# Patient Record
Sex: FEMALE | Race: WHITE | NOT HISPANIC OR LATINO | URBAN - METROPOLITAN AREA
[De-identification: names, ages, dates, MRNs, and addresses within clinical notes are randomized per-mention and may not be internally consistent; named-entity substitution may affect disease eponyms.]

---

## 2017-01-03 ENCOUNTER — TELEPHONE (OUTPATIENT)
Dept: MEDICAL GROUP | Facility: MEDICAL CENTER | Age: 77
End: 2017-01-03

## 2017-01-03 ENCOUNTER — HOSPITAL ENCOUNTER (OUTPATIENT)
Dept: LAB | Facility: MEDICAL CENTER | Age: 77
End: 2017-01-03
Attending: NURSE PRACTITIONER
Payer: MEDICARE

## 2017-01-03 DIAGNOSIS — I10 ESSENTIAL HYPERTENSION: ICD-10-CM

## 2017-01-03 DIAGNOSIS — E55.9 VITAMIN D DEFICIENCY: ICD-10-CM

## 2017-01-03 DIAGNOSIS — E78.5 HYPERLIPIDEMIA WITH TARGET LDL LESS THAN 130: ICD-10-CM

## 2017-01-03 DIAGNOSIS — M85.80 OSTEOPENIA: ICD-10-CM

## 2017-01-03 LAB
25(OH)D3 SERPL-MCNC: 72 NG/ML (ref 30–100)
ALBUMIN SERPL BCP-MCNC: 4.6 G/DL (ref 3.2–4.9)
ALBUMIN/GLOB SERPL: 1.5 G/DL
ALP SERPL-CCNC: 36 U/L (ref 30–99)
ALT SERPL-CCNC: 20 U/L (ref 2–50)
ANION GAP SERPL CALC-SCNC: 8 MMOL/L (ref 0–11.9)
AST SERPL-CCNC: 21 U/L (ref 12–45)
BILIRUB SERPL-MCNC: 0.5 MG/DL (ref 0.1–1.5)
BUN SERPL-MCNC: 35 MG/DL (ref 8–22)
CALCIUM SERPL-MCNC: 10.4 MG/DL (ref 8.5–10.5)
CHLORIDE SERPL-SCNC: 104 MMOL/L (ref 96–112)
CHOLEST SERPL-MCNC: 236 MG/DL (ref 100–199)
CO2 SERPL-SCNC: 26 MMOL/L (ref 20–33)
CREAT SERPL-MCNC: 0.87 MG/DL (ref 0.5–1.4)
CREAT UR-MCNC: 165.7 MG/DL
GLOBULIN SER CALC-MCNC: 3 G/DL (ref 1.9–3.5)
GLUCOSE SERPL-MCNC: 98 MG/DL (ref 65–99)
HDLC SERPL-MCNC: 64 MG/DL
LDLC SERPL CALC-MCNC: 152 MG/DL
MICROALBUMIN UR-MCNC: 0.9 MG/DL
MICROALBUMIN/CREAT UR: 5 MG/G (ref 0–30)
POTASSIUM SERPL-SCNC: 4.5 MMOL/L (ref 3.6–5.5)
PROT SERPL-MCNC: 7.6 G/DL (ref 6–8.2)
SODIUM SERPL-SCNC: 138 MMOL/L (ref 135–145)
TRIGL SERPL-MCNC: 100 MG/DL (ref 0–149)

## 2017-01-03 PROCEDURE — 80061 LIPID PANEL: CPT

## 2017-01-03 PROCEDURE — 82570 ASSAY OF URINE CREATININE: CPT

## 2017-01-03 PROCEDURE — 82043 UR ALBUMIN QUANTITATIVE: CPT

## 2017-01-03 PROCEDURE — 36415 COLL VENOUS BLD VENIPUNCTURE: CPT

## 2017-01-03 PROCEDURE — 82306 VITAMIN D 25 HYDROXY: CPT

## 2017-01-03 PROCEDURE — 80053 COMPREHEN METABOLIC PANEL: CPT

## 2017-01-09 ENCOUNTER — OFFICE VISIT (OUTPATIENT)
Dept: MEDICAL GROUP | Facility: MEDICAL CENTER | Age: 77
End: 2017-01-09
Payer: MEDICARE

## 2017-01-09 VITALS
DIASTOLIC BLOOD PRESSURE: 60 MMHG | BODY MASS INDEX: 21.38 KG/M2 | HEIGHT: 66 IN | HEART RATE: 72 BPM | SYSTOLIC BLOOD PRESSURE: 102 MMHG | OXYGEN SATURATION: 97 % | WEIGHT: 133 LBS | TEMPERATURE: 97 F

## 2017-01-09 DIAGNOSIS — N95.9 POST MENOPAUSAL PROBLEMS: ICD-10-CM

## 2017-01-09 DIAGNOSIS — Z12.31 ENCOUNTER FOR SCREENING MAMMOGRAM FOR MALIGNANT NEOPLASM OF BREAST: ICD-10-CM

## 2017-01-09 DIAGNOSIS — M85.80 OSTEOPENIA: ICD-10-CM

## 2017-01-09 DIAGNOSIS — I10 ESSENTIAL HYPERTENSION: ICD-10-CM

## 2017-01-09 DIAGNOSIS — H91.91 DECREASED HEARING OF RIGHT EAR: ICD-10-CM

## 2017-01-09 DIAGNOSIS — M48.56XS COMPRESSION FRACTURE OF LUMBAR SPINE, NON-TRAUMATIC, SEQUELA: ICD-10-CM

## 2017-01-09 DIAGNOSIS — E78.5 HYPERLIPIDEMIA WITH TARGET LDL LESS THAN 130: ICD-10-CM

## 2017-01-09 DIAGNOSIS — R01.1 CARDIAC MURMUR: ICD-10-CM

## 2017-01-09 PROCEDURE — G0439 PPPS, SUBSEQ VISIT: HCPCS | Performed by: NURSE PRACTITIONER

## 2017-01-09 RX ORDER — LISINOPRIL 10 MG/1
10 TABLET ORAL DAILY
Qty: 90 TAB | Refills: 3 | Status: SHIPPED | OUTPATIENT
Start: 2017-01-09 | End: 2017-11-03 | Stop reason: SDUPTHER

## 2017-01-09 RX ORDER — TRIAMTERENE AND HYDROCHLOROTHIAZIDE 37.5; 25 MG/1; MG/1
1 CAPSULE ORAL EVERY MORNING
Qty: 90 CAP | Refills: 3 | Status: SHIPPED | OUTPATIENT
Start: 2017-01-09 | End: 2017-11-03 | Stop reason: SDUPTHER

## 2017-01-09 RX ORDER — ATORVASTATIN CALCIUM 20 MG/1
20 TABLET, FILM COATED ORAL DAILY
Qty: 90 TAB | Refills: 0 | Status: SHIPPED | OUTPATIENT
Start: 2017-01-09 | End: 2017-02-27 | Stop reason: SDUPTHER

## 2017-01-09 RX ORDER — MULTIVIT-MIN/IRON/FOLIC ACID/K 18-600-40
2000 CAPSULE ORAL DAILY
Qty: 30 CAP | Refills: 0
Start: 2017-01-09 | End: 2017-12-11 | Stop reason: SDUPTHER

## 2017-01-09 ASSESSMENT — PATIENT HEALTH QUESTIONNAIRE - PHQ9: CLINICAL INTERPRETATION OF PHQ2 SCORE: 0

## 2017-01-09 NOTE — ASSESSMENT & PLAN NOTE
Her current trg adn HDL are at goal.  LDL is not at goal despite 40 mg lovastatin.  Will change to lipitor and repeat lab in 6 weeks.  Goal of LDL is <130.  Reviewed risks and benefits of lipitor with pt including but not limited to: rhabdo, myalgia, elevated lft

## 2017-01-09 NOTE — PROGRESS NOTES
Subjective:      Cinthia Arreola is a 76 y.o. female who presents with Annual Wellness Visit            HPI  Seen in f/u for HRA.  She is feeling well.    Needs refills on meds.  Reviewed lab with pt.  Her CMP, GFR, D, albcr ratio are wnl  LP shows good trg and HDL. LDL is too high at 153.  She is on 40 mg lovastatin.      Chief Complaint   Patient presents with   • Annual Wellness Visit         HPI:  Cinthia is a 76 y.o. here for Medicare Annual Wellness Visit     Patient Active Problem List    Diagnosis Date Noted   • Preventative health care 06/05/2013   • Osteopenia    • Cardiac murmur    • Hypertension    • Hyperlipidemia with target LDL less than 130    • Compression fracture of lumbar spine, non-traumatic (HCC) 11/26/2012       Current Outpatient Prescriptions   Medication Sig Dispense Refill   • lovastatin (MEVACOR) 40 MG tablet Take 1 Tab by mouth every day. 90 Tab 0   • tretinoin (RETIN-A) 0.05 % cream Apply 20 g to affected area(s) every day. Apply to face 60 g 3   • valacyclovir (VALTREX) 500 MG Tab Take 1 Tab by mouth 2 times a day as needed. 180 Tab 1   • triamterene/hctz (MAXZIDE-25/DYAZIDE) 37.5-25 MG Cap Take 1 Cap by mouth every morning. 90 Cap 3   • lisinopril (PRINIVIL) 10 MG Tab Take 1 Tab by mouth every day. 90 Tab 3   • aspirin EC (ECOTRIN) 81 MG TBEC Take 81 mg by mouth every day.     • folic acid (FOLVITE) 1 MG TABS Take 1 mg by mouth every day.     • magnesium chloride SR (SLO-MAG) 535 (64 MG) MG TBCR Take 535 mg by mouth every day.     • coenzyme Q-10 30 MG capsule Take 60 mg by mouth every day.     • Selenium (SELENIMIN PO) Take  by mouth.     • Ginkgo Biloba 40 MG TABS Take  by mouth.     • MULTIPLE VITAMIN PO Take  by mouth.     • Glucosamine-Chondroit-Vit C-Mn (GLUCOSAMINE CHONDR 1500 COMPLX PO) Take  by mouth.     • Flaxseed, Linseed, (FLAX SEED OIL) 1000 MG CAPS Take  by mouth.     • ascorbic acid (ASCORBIC ACID) 500 MG TABS Take 500 mg by mouth every day.     • Probiotic Product  (PROBIOTIC DAILY PO) Take 1 Tab by mouth every day.     • Biotin 1000 MCG TABS Take  by mouth.     • Calcium Citrate-Vitamin D (CITRACAL + D PO) Take  by mouth.     • cyanocobalamin (VITAMIN B-12) 100 MCG TABS Take 1,000 mcg by mouth every day.     • pyridoxine (VITAMIN B-6) 50 MG TABS Take 100 mg by mouth every day.     • docosahexanoic acid (OMEGA 3 FA) 1000 MG CAPS Take 1,000 mg by mouth 2 times a day, with meals.       No current facility-administered medications for this visit.            Current supplements as per medication list.       Allergies: Review of patient's allergies indicates no known allergies.    Current social contact/activities:   1.  Tennis  2.  Work out with gym  3.  Read  4.  Play bridge  5.  Play puzzles  6.  movies     She  reports that she quit smoking about 37 years ago. She has never used smokeless tobacco. She reports that she drinks about 2.0 oz of alcohol per week. She reports that she does not use illicit drugs.  Counseling given: Yes        DPA/Advanced directive: Patient does have an advanced directive. If not on file, instructed to bring in a copy to scan into their chart. If no advanced directive exists, a packet and workshop information was given on advanced directives.     ROS:    Gait: Uses no assistive device   Ostomy: no   Other tubes: no   Amputations: no   Chronic oxygen use no   Last eye exam due in 3/17   : Reports occas stress and urge incontinence.       Screening:    Depression Screening    Little interest or pleasure in doing things?  0 - not at all  Feeling down, depressed , or hopeless? 0 - not at all  Trouble falling or staying asleep, or sleeping too much?     Feeling tired or having little energy?     Poor appetite or overeating?     Feeling bad about yourself - or that you are a failure or have let yourself or your family down?    Trouble concentrating on things, such as reading the newspaper or watching television?    Moving or speaking so slowly that other  people could have noticed.  Or the opposite - being so fidgety or restless that you have been moving around a lot more than usual?     Thoughts that you would be better off dead, or of hurting yourself?     Patient Health Questionnaire Score:      If depressive symptoms identified deferred to follow up visit unless specifically addressed in assesment and plan.      Screening for Cognitive Impairment    Three Minute Recall (banana, sunrise, fence)  3/3    Draw clock face with all 12 numbers set to the hand to show 10 minures past 11 o'clock  1 5/5  Cognitive concerns identified defferred for follow up unless specifically addressed in assesment and plan.    Fall Risk Assessment    Has the patient had two or more falls in the last year or any fall with injury in the last year?  No    Safety Assessment    Throw rugs on floor.     Handrails on all stairs.     Good lighting in all hallways.     Difficulty hearing.     Patient counseled about all safety risks that were identified.    Functional Assessment ADLs    Are there any barriers preventing you from cooking for yourself or meeting nutritional needs?  No.    Are there any barriers preventing you from driving safely or obtaining transportation?  No.    Are there any barriers preventing you from using a telephone or calling for help?  No.    Are there any barriers preventing you from shopping?  No.    Are there any barriers preventing you from taking care of your own finances?  No.    Are there any barriers preventing you from managing your medications?  No.    Are currently engaing any exercise or physical activity?  No.  Pt keeps herself with daily activities.     Health Maintenance Summary                IMM ZOSTER VACCINE Overdue 11/19/2000     Annual Wellness Visit Overdue 11/22/2014      Done 11/21/2013     IMM PNEUMOCOCCAL 65+ (ADULT) LOW/MEDIUM RISK SERIES Overdue 9/22/2016      Done 9/22/2015 Imm Admin: Pneumococcal Conjugate Vaccine (Prevnar/PCV-13)     "MAMMOGRAM Next Due 3/9/2017      Done 3/9/2016 MA-SCREEN MAMMO W/CAD-BILAT     Patient has more history with this topic...    BONE DENSITY Next Due 2/23/2020      Done 2/23/2015 DS-BONE DENSITY STUDY (DEXA)     Patient has more history with this topic...    IMM DTaP/Tdap/Td Vaccine Next Due 11/21/2023      Done 11/21/2013 Imm Admin: Tdap Vaccine    COLONOSCOPY Next Due 1/8/2024      Done 1/8/2014 AMB REFERRAL TO GI FOR COLONOSCOPY          Patient Care Team:  SAGAR Mayfield as PCP - General (Family Medicine)      Social History   Substance Use Topics   • Smoking status: Former Smoker     Quit date: 01/01/1980   • Smokeless tobacco: Never Used   • Alcohol Use: 2.0 oz/week     4 Glasses of wine per week     Family History   Problem Relation Age of Onset   • Stroke Mother    • Hypertension Mother    • Hyperlipidemia Mother    • Diabetes Mother    • Stroke Father    • Hypertension Father    • Diabetes Father    • Diabetes Brother    • Cancer Maternal Grandfather    • Lung Disease Paternal Grandfather      She  has a past medical history of Osteopenia; Hyperlipidemia LDL goal < 130; Hypertension; Cardiac murmur; and Compression fracture of lumbar spine, non-traumatic (HCC) (11/26/12). She also has no past medical history of Breast cancer (HCC).   Past Surgical History   Procedure Laterality Date   • Rhinoplasty     • Nasal endoscopy  2008     removal of clot after fall   • Cataract extraction with iol  2012     celso eyes   • Knee arthroscopy       meniscus repair celso knees   • Tonsillectomy and adenoidectomy  age 3   • Tubal coagulation laparoscopic bilateral         Exam:     Blood pressure 102/60, pulse 72, temperature 36.1 °C (97 °F), height 1.664 m (5' 5.5\"), weight 60.328 kg (133 lb), SpO2 97 %. Body mass index is 21.79 kg/(m^2).    Hearing fair.  has tinnitus.  Has dec hearing in rt ear  Dentition good  Alert, oriented in no acute distress.  Eye contact is good, speech goal directed, affect " calm      Assessment and Plan. The following treatment and monitoring plan is recommended:       Services needed: as per orders if indicated.  Health Care Screening: Age-appropriate preventive services Medicare covers discussed today and ordered if indicated.    Referrals offered: Community-based lifestyle interventions to reduce health risks and promote self-management and wellness, fall prevention, nutrition, physical activity, tobacco-use cessation, weight loss, and mental health services as per orders if indicated.    Discussion today about general wellness and lifestyle habits:    · Prevent falls and reduce trip hazards; Cautioned about securing or removing rugs.  · Have a working fire alarm and carbon monoxide detector;   · Engage in regular physical activity and social activities       Follow-up: 6 weeks for lab review and then     ROS    Review of Systems   Constitutional: Negative.  Negative for fever, chills, weight loss, malaise/fatigue and diaphoresis.   HENT: Negative.  Negative for hearing loss, ear pain, nosebleeds, congestion, sore throat, neck pain, tinnitus and ear discharge.  just got over a cold.    Eyes: Negative.  Negative for blurred vision, double vision, photophobia, pain, discharge and redness.   Respiratory: Negative.  Negative for cough, hemoptysis, sputum production, shortness of breath, wheezing and stridor.    Cardiovascular: Negative.  Negative for chest pain, palpitations, orthopnea, claudication, leg swelling and PND.   Gastrointestinal: Negative.  Negative for heartburn, nausea, vomiting, abdominal pain, diarrhea, blood in stool and melena.  chronic intermittent constipation,  Genitourinary: Negative.  Negative for dysuria, urgency, frequency, incontinence, hematuria and flank pain.   Musculoskeletal: Negative.  Negative for myalgias, back pain, falls.  joccas rt knee pain   Skin: Negative.  Negative for itching and rash. went to derm last week.  Cryo therapy done.   Neurological:  "Negative.  Negative for dizziness, tingling, tremors, sensory change, speech change, focal weakness, seizures, loss of consciousness, weakness and headaches.   Endo/Heme/Allergies: Negative.  Negative for environmental allergies and polydipsia. Does bruise/bleed easily.   Psychiatric/Behavioral: Negative.  Negative for depression, suicidal ideas, hallucinations, memory loss and substance abuse. The patient is not nervous/anxious and does have insomnia with waking up at nite.    All other systems reviewed and are negative.         Objective:     /60 mmHg  Pulse 72  Temp(Src) 36.1 °C (97 °F)  Ht 1.664 m (5' 5.5\")  Wt 60.328 kg (133 lb)  BMI 21.79 kg/m2  SpO2 97%     Physical Exam  Physical Exam   Vitals reviewed.  Constitutional: oriented to person, place, and time. appears well-developed and well-nourished. No distress.   HENT: Head: Normocephalic and atraumatic. Bilateral tympanic membranes wnl w/o bulging.  Right Ear: External ear normal. Left Ear: External ear normal. Nose: Nose normal.  Mouth/Throat: Oropharynx is clear and moist. No oropharyngeal exudate. celso tm wnl. Eyes: Conjunctivae and EOM are normal. Pupils are equal, round, and reactive to light. Right eye exhibits no discharge. Left eye exhibits no discharge. No scleral icterus.    Neck: Normal range of motion. Neck supple. No JVD present.   Cardiovascular: Normal rate, regular rhythm, normal heart sounds and intact distal pulses.  Exam reveals no gallop and no friction rub.  No murmur heard.  No carotid bruits   Pulmonary/Chest: Effort normal and breath sounds normal. No stridor. No respiratory distress. no wheezes or rales. exhibits no tenderness.   Abdominal: Soft. Bowel sounds are normal. exhibits no distension and no mass. No tenderness. no rebound and no guarding.   Musculoskeletal: Normal range of motion. exhibits no edema or tenderness.  celso pedal pulses 2+.  Lymphadenopathy:  no cervical or supraclavicular adenopathy.   Neurological: " alert and oriented to person, place, and time. has normal reflexes. displays normal reflexes. No cranial nerve deficit. exhibits normal muscle tone. Coordination normal.   Skin: Skin is warm and dry. No rash noted. no diaphoresis. No erythema. No pallor.   Psychiatric: normal mood and affect. behavior is normal.               Assessment/Plan:     1. Osteopenia  Cholecalciferol (VITAMIN D) 2000 UNITS Cap    Annual Wellness Visit - Includes PPPS Subsequent ()    DS-BONE DENSITY STUDY (DEXA)    repeat dexa and mammo in march.  repeat reclast if approp after dexa   2. Cardiac murmur  Cholecalciferol (VITAMIN D) 2000 UNITS Cap    Annual Wellness Visit - Includes PPPS Subsequent ()    stable w/o tx needed   3. Essential hypertension  Cholecalciferol (VITAMIN D) 2000 UNITS Cap    Annual Wellness Visit - Includes PPPS Subsequent ()    triamterene/hctz (MAXZIDE-25/DYAZIDE) 37.5-25 MG Cap    lisinopril (PRINIVIL) 10 MG Tab    stable on meds.  refilled all meds   4. Hyperlipidemia with target LDL less than 130  Cholecalciferol (VITAMIN D) 2000 UNITS Cap    Annual Wellness Visit - Includes PPPS Subsequent ()    triamterene/hctz (MAXZIDE-25/DYAZIDE) 37.5-25 MG Cap    lisinopril (PRINIVIL) 10 MG Tab    atorvastatin (LIPITOR) 20 MG Tab    CMP14+LP    dc lovastating.  change to lipitor 20 mg daily. r echeck CMP, LP in 6 weeks.  f/u after lab   5. Compression fracture of lumbar spine, non-traumatic, sequela  Cholecalciferol (VITAMIN D) 2000 UNITS Cap    Annual Wellness Visit - Includes PPPS Subsequent ()    no current sx or tx needed   6. Essential hypertension  Cholecalciferol (VITAMIN D) 2000 UNITS Cap    Annual Wellness Visit - Includes PPPS Subsequent ()    triamterene/hctz (MAXZIDE-25/DYAZIDE) 37.5-25 MG Cap    lisinopril (PRINIVIL) 10 MG Tab    stable and controlled on meds   7. Post menopausal problems  DS-BONE DENSITY STUDY (DEXA)   8. Encounter for screening mammogram for malignant neoplasm of  breast  MA-SCREEN MAMMO W/CAD-BILAT   9. Decreased hearing of right ear  REFERRAL TO AUDIOLOGY

## 2017-01-09 NOTE — ASSESSMENT & PLAN NOTE
She has been on reclast for the last several years.  She will be due dexa repeat in 2/17.  She is due repeat reclast in march.  She is on ca++ with d.  Exercise regulalry.

## 2017-01-09 NOTE — MR AVS SNAPSHOT
"        Cinthia Arreola   2017 9:30 AM   Office Visit   MRN: [id number]    Department:  [facility]   Dept Phone:  [phone]    Description:  Female : 1940   Provider:  SAGAR Mayfield           Reason for Visit     Annual Wellness Visit           Allergies as of 2017     No Known Allergies      You were diagnosed with     Osteopenia   [254254]   repeat dexa and mammo in march.  repeat reclast if approp after dexa    Cardiac murmur   [600924]   stable w/o tx needed    Essential hypertension   [8558717]   stable on meds.  refilled all meds    Hyperlipidemia with target LDL less than 130   [744769]   dc lovastating.  change to lipitor 20 mg daily. r echeck CMP, LP in 6 weeks.  f/u after lab    Compression fracture of lumbar spine, non-traumatic, sequela   [808687]   no current sx or tx needed    Essential hypertension   [0000870]   stable and controlled on meds    Post menopausal problems   [768010]       Encounter for screening mammogram for malignant neoplasm of breast   [581892]       Decreased hearing of right ear   [7048788]         Vital Signs     Blood Pressure Pulse Temperature Height Weight Body Mass Index    102/60 mmHg 72 36.1 °C (97 °F) 1.664 m (5' 5.5\") 60.328 kg (133 lb) 21.79 kg/m2    Oxygen Saturation Smoking Status                97% Former Smoker          Basic Information     Date Of Birth Sex Race Ethnicity Preferred Language    1940 Female White Non- English      Problem List              ICD-10-CM Priority Class Noted - Resolved    Preventative health care Z00.00   2013 - Present    Osteopenia M85.80   Unknown - Present    Cardiac murmur R01.1   Unknown - Present    Hypertension I10   Unknown - Present    Hyperlipidemia with target LDL less than 130 E78.5   Unknown - Present    Compression fracture of lumbar spine, non-traumatic (HCC) M48.56XA   2012 - Present      Health Maintenance        Date Due Completion Dates    IMM ZOSTER VACCINE " 11/19/2000 ---    IMM PNEUMOCOCCAL 65+ (ADULT) LOW/MEDIUM RISK SERIES (2 of 2 - PPSV23) 9/22/2016 9/22/2015    MAMMOGRAM 3/9/2017 3/9/2016, 2/23/2015, 1/2/2014, 12/12/2013, 12/12/2013    BONE DENSITY 2/23/2020 2/23/2015, 11/26/2012 (Done)    Override on 11/26/2012: Done    IMM DTaP/Tdap/Td Vaccine (2 - Td) 11/21/2023 11/21/2013    COLONOSCOPY 1/8/2024 1/8/2014            Current Immunizations     13-VALENT PCV PREVNAR 9/22/2015    Influenza TIV (IM) 10/8/2013    Influenza Vaccine Adult HD 9/13/2016, 9/22/2015    SHINGLES VACCINE 10/1/2015    Tdap Vaccine 11/21/2013      Below and/or attached are the medications your provider expects you to take. Review all of your home medications and newly ordered medications with your provider and/or pharmacist. Follow medication instructions as directed by your provider and/or pharmacist. Please keep your medication list with you and share with your provider. Update the information when medications are discontinued, doses are changed, or new medications (including over-the-counter products) are added; and carry medication information at all times in the event of emergency situations     Allergies:  No Known Allergies          Medications  Valid as of: January 09, 2017 - 10:33 AM    Generic Name Brand Name Tablet Size Instructions for use    Ascorbic Acid (Tab) ascorbic acid 500 MG Take 500 mg by mouth every day.        Aspirin (Tablet Delayed Response) ECOTRIN 81 MG Take 81 mg by mouth every day.        Atorvastatin Calcium (Tab) LIPITOR 20 MG Take 1 Tab by mouth every day.        Biotin (Tab) Biotin 1000 MCG Take  by mouth.        Calcium Citrate-Vitamin D   Take  by mouth.        Cholecalciferol (Cap) Vitamin D 2000 UNITS Take 1 Cap by mouth every day.        Coenzyme Q10 (Cap) coenzyme Q-10 30 MG Take 60 mg by mouth every day.        Cyanocobalamin (Tab) VITAMIN B-12 100 MCG Take 1,000 mcg by mouth every day.        Flaxseed (Linseed) (Cap) Flax Seed Oil 1000 MG Take  by  mouth.        Folic Acid (Tab) FOLVITE 1 MG Take 1 mg by mouth every day.        Ginkgo Biloba (Tab) Ginkgo Biloba 40 MG Take  by mouth.        Glucosamine-Chondroit-Vit C-Mn   Take  by mouth.        Lisinopril (Tab) PRINIVIL 10 MG Take 1 Tab by mouth every day.        Lovastatin (Tab) MEVACOR 40 MG Take 1 Tab by mouth every day.        Magnesium Chloride (Tab CR) SLO- (64 MG) MG Take 535 mg by mouth every day.        Multiple Vitamin   Take  by mouth.        Omega-3 Fatty Acids (Cap) OMEGA 3 FA 1000 MG Take 1,000 mg by mouth 2 times a day, with meals.        Probiotic Product   Take 1 Tab by mouth every day.        Pyridoxine HCl (Tab) VITAMIN B-6 50 MG Take 100 mg by mouth every day.        Selenium   Take  by mouth.        Tretinoin (Cream) RETIN-A 0.05 % Apply 20 g to affected area(s) every day. Apply to face        Triamterene-HCTZ (Cap) MAXZIDE-25/DYAZIDE 37.5-25 MG Take 1 Cap by mouth every morning.        ValACYclovir HCl (Tab) VALTREX 500 MG Take 1 Tab by mouth 2 times a day as needed.        .                 Medicines prescribed today were sent to:     [facility] - [address] [facility] [address]    Phone: [phone] Fax: [phone]    Open 24 Hours?: No    [facility] [address] [place] [address], [place] - [address]) [place] [address]    Phone: [phone] Fax: [phone]    Open 24 Hours?: No    [facility] - [place], [place] - [address] [facility]    [address] [facility] [place] [address]    Phone: [phone] Fax: [phone]    Open 24 Hours?: No      Medication refill instructions:       If your prescription bottle indicates you have medication refills left, it is not necessary to call your provider’s office. Please contact your pharmacy and they will refill your medication.    If your prescription bottle indicates you do not have any refills left, you may request refills at any time  through one of the following ways: The online [employer] system (except Urgent Care), by calling your provider’s office, or by asking your pharmacy to contact your provider’s office with a refill request. Medication refills are processed only during regular business hours and may not be available until the next business day. Your provider may request additional information or to have a follow-up visit with you prior to refilling your medication.   *Please Note: Medication refills are assigned a new Rx number when refilled electronically. Your pharmacy may indicate that no refills were authorized even though a new prescription for the same medication is available at the pharmacy. Please request the medicine by name with the pharmacy before contacting your provider for a refill.        Your To Do List     Future Labs/Procedures Complete By Expires    DS-BONE DENSITY STUDY (DEXA)  As directed 7/12/2017    MA-SCREEN MAMMO W/CAD-BILAT  As directed 2/10/2018      Referral     A referral request has been sent to our patient care coordination department. Please allow 3-5 business days for us to process this request and contact you either by phone or mail. If you do not hear from us by the 5th business day, please call us at [phone].           [facility] Access Code: Activation code not generated  Current [facility] Status: Active

## 2017-02-21 ENCOUNTER — TELEPHONE (OUTPATIENT)
Dept: MEDICAL GROUP | Facility: MEDICAL CENTER | Age: 77
End: 2017-02-21

## 2017-02-21 DIAGNOSIS — H91.91 DECREASED HEARING, RIGHT: ICD-10-CM

## 2017-02-21 NOTE — TELEPHONE ENCOUNTER
Pt states she saw the audiologist and they recommended dr buckner ent. Pt is asking if this is a good recommendation and if so can you place the referral-[facility]

## 2017-02-27 ENCOUNTER — HOSPITAL ENCOUNTER (OUTPATIENT)
Dept: LAB | Facility: MEDICAL CENTER | Age: 77
End: 2017-02-27
Attending: NURSE PRACTITIONER
Payer: MEDICARE

## 2017-02-27 LAB
ALBUMIN SERPL BCP-MCNC: 4.1 G/DL (ref 3.2–4.9)
ALBUMIN/GLOB SERPL: 1.3 G/DL
ALP SERPL-CCNC: 41 U/L (ref 30–99)
ALT SERPL-CCNC: 20 U/L (ref 2–50)
ANION GAP SERPL CALC-SCNC: 8 MMOL/L (ref 0–11.9)
AST SERPL-CCNC: 24 U/L (ref 12–45)
BILIRUB SERPL-MCNC: 0.6 MG/DL (ref 0.1–1.5)
BUN SERPL-MCNC: 28 MG/DL (ref 8–22)
CALCIUM SERPL-MCNC: 9.7 MG/DL (ref 8.5–10.5)
CHLORIDE SERPL-SCNC: 105 MMOL/L (ref 96–112)
CHOLEST SERPL-MCNC: 182 MG/DL (ref 100–199)
CO2 SERPL-SCNC: 26 MMOL/L (ref 20–33)
CREAT SERPL-MCNC: 0.82 MG/DL (ref 0.5–1.4)
GLOBULIN SER CALC-MCNC: 3.1 G/DL (ref 1.9–3.5)
GLUCOSE SERPL-MCNC: 93 MG/DL (ref 65–99)
HDLC SERPL-MCNC: 64 MG/DL
LDLC SERPL CALC-MCNC: 105 MG/DL
POTASSIUM SERPL-SCNC: 4.2 MMOL/L (ref 3.6–5.5)
PROT SERPL-MCNC: 7.2 G/DL (ref 6–8.2)
SODIUM SERPL-SCNC: 139 MMOL/L (ref 135–145)
TRIGL SERPL-MCNC: 64 MG/DL (ref 0–149)

## 2017-02-27 PROCEDURE — 80061 LIPID PANEL: CPT

## 2017-02-27 PROCEDURE — 80053 COMPREHEN METABOLIC PANEL: CPT

## 2017-02-27 PROCEDURE — 36415 COLL VENOUS BLD VENIPUNCTURE: CPT

## 2017-02-27 RX ORDER — ATORVASTATIN CALCIUM 20 MG/1
TABLET, FILM COATED ORAL
Qty: 90 TAB | Refills: 3 | Status: SHIPPED | OUTPATIENT
Start: 2017-02-27 | End: 2017-12-01 | Stop reason: SDUPTHER

## 2017-03-09 ENCOUNTER — APPOINTMENT (OUTPATIENT)
Dept: MEDICAL GROUP | Facility: MEDICAL CENTER | Age: 77
End: 2017-03-09
Payer: MEDICARE

## 2017-03-20 ENCOUNTER — HOSPITAL ENCOUNTER (OUTPATIENT)
Dept: RADIOLOGY | Facility: MEDICAL CENTER | Age: 77
End: 2017-03-20
Attending: NURSE PRACTITIONER
Payer: MEDICARE

## 2017-03-20 DIAGNOSIS — M85.80 OSTEOPENIA: ICD-10-CM

## 2017-03-20 DIAGNOSIS — Z12.31 ENCOUNTER FOR SCREENING MAMMOGRAM FOR MALIGNANT NEOPLASM OF BREAST: ICD-10-CM

## 2017-03-20 DIAGNOSIS — N95.9 POST MENOPAUSAL PROBLEMS: ICD-10-CM

## 2017-03-20 PROCEDURE — 77063 BREAST TOMOSYNTHESIS BI: CPT

## 2017-03-21 ENCOUNTER — OFFICE VISIT (OUTPATIENT)
Dept: MEDICAL GROUP | Facility: MEDICAL CENTER | Age: 77
End: 2017-03-21
Payer: MEDICARE

## 2017-03-21 ENCOUNTER — TELEPHONE (OUTPATIENT)
Dept: MEDICAL GROUP | Facility: MEDICAL CENTER | Age: 77
End: 2017-03-21

## 2017-03-21 VITALS
WEIGHT: 136 LBS | BODY MASS INDEX: 21.86 KG/M2 | TEMPERATURE: 97.5 F | SYSTOLIC BLOOD PRESSURE: 120 MMHG | HEART RATE: 66 BPM | HEIGHT: 66 IN | OXYGEN SATURATION: 98 % | DIASTOLIC BLOOD PRESSURE: 60 MMHG

## 2017-03-21 DIAGNOSIS — E78.5 HYPERLIPIDEMIA LDL GOAL <100: ICD-10-CM

## 2017-03-21 DIAGNOSIS — Z71.84 TRAVEL ADVICE ENCOUNTER: ICD-10-CM

## 2017-03-21 DIAGNOSIS — M85.80 OSTEOPENIA: ICD-10-CM

## 2017-03-21 DIAGNOSIS — M48.56XS COMPRESSION FRACTURE OF LUMBAR SPINE, NON-TRAUMATIC, SEQUELA: ICD-10-CM

## 2017-03-21 PROCEDURE — G8482 FLU IMMUNIZE ORDER/ADMIN: HCPCS | Performed by: NURSE PRACTITIONER

## 2017-03-21 PROCEDURE — 4040F PNEUMOC VAC/ADMIN/RCVD: CPT | Performed by: NURSE PRACTITIONER

## 2017-03-21 PROCEDURE — 99214 OFFICE O/P EST MOD 30 MIN: CPT | Performed by: NURSE PRACTITIONER

## 2017-03-21 PROCEDURE — G8432 DEP SCR NOT DOC, RNG: HCPCS | Performed by: NURSE PRACTITIONER

## 2017-03-21 PROCEDURE — 1036F TOBACCO NON-USER: CPT | Performed by: NURSE PRACTITIONER

## 2017-03-21 PROCEDURE — 1101F PT FALLS ASSESS-DOCD LE1/YR: CPT | Performed by: NURSE PRACTITIONER

## 2017-03-21 PROCEDURE — G8419 CALC BMI OUT NRM PARAM NOF/U: HCPCS | Performed by: NURSE PRACTITIONER

## 2017-03-21 RX ORDER — CIPROFLOXACIN 500 MG/1
500 TABLET, FILM COATED ORAL 2 TIMES DAILY
Qty: 20 TAB | Refills: 0 | Status: SHIPPED | OUTPATIENT
Start: 2017-03-21 | End: 2017-05-05

## 2017-03-21 RX ORDER — ZOLEDRONIC ACID 5 MG/100ML
5 INJECTION, SOLUTION INTRAVENOUS ONCE
Qty: 100 ML | Refills: 0 | Status: SHIPPED
Start: 2017-03-21 | End: 2018-05-07 | Stop reason: SDUPTHER

## 2017-03-21 RX ORDER — AZITHROMYCIN 250 MG/1
TABLET, FILM COATED ORAL
Qty: 6 TAB | Refills: 0 | Status: SHIPPED | OUTPATIENT
Start: 2017-03-21 | End: 2017-03-26

## 2017-03-21 NOTE — PROGRESS NOTES
Subjective:      Cinthia Arreola is a 76 y.o. female who presents with No chief complaint on file.            HPI  Seen in fu/ for hyperlipidemia.  Feeling well.  She was started on med last visit. kat lipitor well.    [place]  Needs travel meds.  Reviewed mammo suad pt.  She has dense breasts.  Will consider sonocine.  Her dexa scan shows worsening of her osteopenia.  She is on ca++ and d.  Last d in jan was wnl  CMP, GFR, LP is wnl.  LDL is down from 152 to 106.  trg and HDL are at goal.    Patient Active Problem List    Diagnosis Date Noted   • Preventative health care 06/05/2013   • Osteopenia    • Cardiac murmur    • Hypertension    • Hyperlipidemia with target LDL less than 130    • Compression fracture of lumbar spine, non-traumatic (CMS-HCC) 11/26/2012     Current Outpatient Prescriptions   Medication Sig Dispense Refill   • atorvastatin (LIPITOR) 20 MG Tab Take 1 tablet by mouth  every day 90 Tab 3   • Cholecalciferol (VITAMIN D) 2000 UNITS Cap Take 1 Cap by mouth every day. 30 Cap 0   • triamterene/hctz (MAXZIDE-25/DYAZIDE) 37.5-25 MG Cap Take 1 Cap by mouth every morning. 90 Cap 3   • lisinopril (PRINIVIL) 10 MG Tab Take 1 Tab by mouth every day. 90 Tab 3   • lovastatin (MEVACOR) 40 MG tablet Take 1 Tab by mouth every day. 90 Tab 0   • tretinoin (RETIN-A) 0.05 % cream Apply 20 g to affected area(s) every day. Apply to face 60 g 3   • valacyclovir (VALTREX) 500 MG Tab Take 1 Tab by mouth 2 times a day as needed. 180 Tab 1   • aspirin EC (ECOTRIN) 81 MG TBEC Take 81 mg by mouth every day.     • folic acid (FOLVITE) 1 MG TABS Take 1 mg by mouth every day.     • magnesium chloride SR (SLO-MAG) 535 (64 MG) MG TBCR Take 535 mg by mouth every day.     • coenzyme Q-10 30 MG capsule Take 60 mg by mouth every day.     • Selenium (SELENIMIN PO) Take  by mouth.     • Ginkgo Biloba 40 MG TABS Take  by mouth.     • MULTIPLE VITAMIN PO Take  by mouth.     • Glucosamine-Chondroit-Vit C-Mn (GLUCOSAMINE  "CHONDR 1500 COMPLX PO) Take  by mouth.     • Flaxseed, Linseed, (FLAX SEED OIL) 1000 MG CAPS Take  by mouth.     • ascorbic acid (ASCORBIC ACID) 500 MG TABS Take 500 mg by mouth every day.     • Probiotic Product (PROBIOTIC DAILY PO) Take 1 Tab by mouth every day.     • Biotin 1000 MCG TABS Take  by mouth.     • Calcium Citrate-Vitamin D (CITRACAL + D PO) Take  by mouth.     • cyanocobalamin (VITAMIN B-12) 100 MCG TABS Take 1,000 mcg by mouth every day.     • pyridoxine (VITAMIN B-6) 50 MG TABS Take 100 mg by mouth every day.     • docosahexanoic acid (OMEGA 3 FA) 1000 MG CAPS Take 1,000 mg by mouth 2 times a day, with meals.       No current facility-administered medications for this visit.     No Known Allergies      ROS    Review of Systems   Constitutional: Negative.  Negative for fever, chills, weight loss, malaise/fatigue and diaphoresis.   HENT: Negative.  Negative for hearing loss, ear pain, nosebleeds, congestion, sore throat, neck pain, tinnitus and ear discharge.    Respiratory: Negative.  Negative for cough, hemoptysis, sputum production, shortness of breath, wheezing and stridor.    Cardiovascular: Negative.  Negative for chest pain, palpitations, orthopnea, claudication, leg swelling and PND.   Gastrointestinal: denies nausea, vomiting, diarrhea, constipation, heartburn, melena or hematochezia.  Genitourinary: Denies dysuria, hematuria, urinary incontinence, frequency or urgency.           Objective:     /60 mmHg  Pulse 66  Temp(Src) 36.4 °C (97.5 °F)  Ht 1.664 m (5' 5.5\")  Wt 61.689 kg (136 lb)  BMI 22.28 kg/m2  SpO2 98%  Breastfeeding? No     Physical Exam      Physical Exam   Vitals reviewed.  Constitutional: oriented to person, place, and time. appears well-developed and well-nourished. No distress.   Cardiovascular: Normal rate, regular rhythm, normal heart sounds and intact distal pulses.  Exam reveals no gallop and no friction rub.  No murmur heard.  No carotid bruits. "   Pulmonary/Chest: Effort normal and breath sounds normal. No stridor. No respiratory distress. no wheezes or rales. exhibits no tenderness.   Musculoskeletal: Normal range of motion.   LNeurological: alert and oriented to person, place, and time. exhibits normal muscle tone. Coordination normal.   Skin: Skin is warm and dry. no diaphoresis.   Psychiatric: normal mood and affect. behavior is normal.            Assessment/Plan:     1. Hyperlipidemia LDL goal <100      significant improvement wiht lipitor.  LP now at goal.  continue med.  f/u 1/18 call for lab slip   2. Osteopenia  zoledronic Acid (RECLAST) 5 MG/100ML Solution IVPB premix    due for reclast.  dexa scan is worse.  contniue ca++ and d.    3. Compression fracture of lumbar spine, non-traumatic, sequela  zoledronic Acid (RECLAST) 5 MG/100ML Solution IVPB premix   4. Travel advice encounter  ciprofloxacin (CIPRO) 500 MG Tab    azithromycin (ZITHROMAX) 250 MG Tab

## 2017-03-21 NOTE — MR AVS SNAPSHOT
"        Cinthia Arreola   3/21/2017 1:00 PM   Office Visit   MRN: [id number]    Department:  [facility]   Dept Phone:  [phone]    Description:  Female : 1940   Provider:  SAGAR Mayfield           Allergies as of 3/21/2017     No Known Allergies      You were diagnosed with     Hyperlipidemia LDL goal <100   [121548]   significant improvement wiht lipitor.  LP now at goal.  continue med.  f/u  call for lab slip    Osteopenia   [764798]   due for reclast.  dexa scan is worse.  contniue ca++ and d.     Compression fracture of lumbar spine, non-traumatic, sequela   [926251]       Travel advice encounter   [[id number]]         Vital Signs     Blood Pressure Pulse Temperature Height Weight Body Mass Index    120/60 mmHg 66 36.4 °C (97.5 °F) 1.664 m (5' 5.5\") 61.689 kg (136 lb) 22.28 kg/m2    Oxygen Saturation Breastfeeding? Smoking Status             98% No Former Smoker         Basic Information     Date Of Birth Sex Race Ethnicity Preferred Language    1940 Female White Non- English      Problem List              ICD-10-CM Priority Class Noted - Resolved    Preventative health care Z00.00   2013 - Present    Osteopenia M85.80   Unknown - Present    Cardiac murmur R01.1   Unknown - Present    Hypertension I10   Unknown - Present    Hyperlipidemia with target LDL less than 130 E78.5   Unknown - Present    Compression fracture of lumbar spine, non-traumatic (CMS-[facility]) M48.56XA   2012 - Present      Health Maintenance        Date Due Completion Dates    MAMMOGRAM 3/9/2017 3/9/2016, 2015, 2014, 2013, 2013    BONE DENSITY 3/20/2022 3/20/2017, 2015, 2012 (Done)    Override on 2012: Done    IMM DTaP/Tdap/Td Vaccine (2 - Td) 2023    COLONOSCOPY 2024            Current Immunizations     13-VALENT PCV PREVNAR 2015    Influenza TIV (IM) 10/8/2013    Influenza Vaccine Adult HD 2016, 2015   " Pneumococcal polysaccharide vaccine (PPSV-23) 9/1/2012, 11/1/2006    SHINGLES VACCINE 10/1/2015    Tdap Vaccine 11/21/2013      Below and/or attached are the medications your provider expects you to take. Review all of your home medications and newly ordered medications with your provider and/or pharmacist. Follow medication instructions as directed by your provider and/or pharmacist. Please keep your medication list with you and share with your provider. Update the information when medications are discontinued, doses are changed, or new medications (including over-the-counter products) are added; and carry medication information at all times in the event of emergency situations     Allergies:  No Known Allergies          Medications  Valid as of: March 21, 2017 -  2:12 PM    Generic Name Brand Name Tablet Size Instructions for use    Ascorbic Acid (Tab) ascorbic acid 500 MG Take 500 mg by mouth every day.        Aspirin (Tablet Delayed Response) ECOTRIN 81 MG Take 81 mg by mouth every day.        Atorvastatin Calcium (Tab) LIPITOR 20 MG Take 1 tablet by mouth  every day        Azithromycin (Tab) ZITHROMAX 250 MG 2 tabs by mouth day 1, 1 tab by mouth days 2-5        Biotin (Tab) Biotin 1000 MCG Take  by mouth.        Calcium Citrate-Vitamin D   Take  by mouth.        Cholecalciferol (Cap) Vitamin D 2000 UNITS Take 1 Cap by mouth every day.        Ciprofloxacin HCl (Tab) CIPRO 500 MG Take 1 Tab by mouth 2 times a day.        Coenzyme Q10 (Cap) coenzyme Q-10 30 MG Take 60 mg by mouth every day.        Cyanocobalamin (Tab) VITAMIN B-12 100 MCG Take 1,000 mcg by mouth every day.        Flaxseed (Linseed) (Cap) Flax Seed Oil 1000 MG Take  by mouth.        Folic Acid (Tab) FOLVITE 1 MG Take 1 mg by mouth every day.        Ginkgo Biloba (Tab) Ginkgo Biloba 40 MG Take  by mouth.        Glucosamine-Chondroit-Vit C-Mn   Take  by mouth.        Lisinopril (Tab) PRINIVIL 10 MG Take 1 Tab by mouth every day.        Lovastatin  (Tab) MEVACOR 40 MG Take 1 Tab by mouth every day.        Magnesium Chloride (Tab CR) SLO- (64 MG) MG Take 535 mg by mouth every day.        Multiple Vitamin   Take  by mouth.        Omega-3 Fatty Acids (Cap) OMEGA 3 FA 1000 MG Take 1,000 mg by mouth 2 times a day, with meals.        Probiotic Product   Take 1 Tab by mouth every day.        Pyridoxine HCl (Tab) VITAMIN B-6 50 MG Take 100 mg by mouth every day.        Selenium   Take  by mouth.        Tretinoin (Cream) RETIN-A 0.05 % Apply 20 g to affected area(s) every day. Apply to face        Triamterene-HCTZ (Cap) MAXZIDE-25/DYAZIDE 37.5-25 MG Take 1 Cap by mouth every morning.        ValACYclovir HCl (Tab) VALTREX 500 MG Take 1 Tab by mouth 2 times a day as needed.        Zoledronic Acid (Solution) RECLAST 5 MG/100ML 100 mL by Intravenous route Once for 1 dose.        .                 Medicines prescribed today were sent to:     [facility] - [address] [facility] [address]    Phone: [phone] Fax: [phone]    Open 24 Hours?: No    [facility] [address] [place] [address], [place]  [address]    [address] [place] [address]    Phone: [phone] Fax: [phone]    Open 24 Hours?: No    [facility] [address] [facility] [place] - [address] [facility]    [address] [facility] [place] [address]    Phone: [phone] Fax: [phone]    Open 24 Hours?: No      Medication refill instructions:       If your prescription bottle indicates you have medication refills left, it is not necessary to call your provider’s office. Please contact your pharmacy and they will refill your medication.    If your prescription bottle indicates you do not have any refills left, you may request refills at any time through one of the following ways: The online [employer] system (except Urgent Care), by calling your provider’s office, or by asking your pharmacy to contact your provider’s office with a  refill request. Medication refills are processed only during regular business hours and may not be available until the next business day. Your provider may request additional information or to have a follow-up visit with you prior to refilling your medication.   *Please Note: Medication refills are assigned a new Rx number when refilled electronically. Your pharmacy may indicate that no refills were authorized even though a new prescription for the same medication is available at the pharmacy. Please request the medicine by name with the pharmacy before contacting your provider for a refill.           [employer] Access Code: Activation code not generated  Current [employer] Status: Active

## 2017-03-22 NOTE — TELEPHONE ENCOUNTER
Pt would like to do research on the test, she is going to call us back on what she decides on.     Pt also wanted you to know about her upcoming ear surgery on May 15, 2017.

## 2017-03-23 RX ORDER — ATOVAQUONE AND PROGUANIL HYDROCHLORIDE 250; 100 MG/1; MG/1
TABLET, FILM COATED ORAL
OUTPATIENT
Start: 2017-03-23

## 2017-05-05 ENCOUNTER — OUTPATIENT INFUSION SERVICES (OUTPATIENT)
Dept: ONCOLOGY | Facility: MEDICAL CENTER | Age: 77
End: 2017-05-05
Attending: NURSE PRACTITIONER
Payer: MEDICARE

## 2017-05-05 VITALS
HEIGHT: 65 IN | DIASTOLIC BLOOD PRESSURE: 43 MMHG | WEIGHT: 136.24 LBS | TEMPERATURE: 98 F | BODY MASS INDEX: 22.7 KG/M2 | SYSTOLIC BLOOD PRESSURE: 113 MMHG | HEART RATE: 63 BPM | RESPIRATION RATE: 18 BRPM | OXYGEN SATURATION: 96 %

## 2017-05-05 LAB
CA-I BLD ISE-SCNC: 1.2 MMOL/L (ref 1.1–1.3)
CREAT BLD-MCNC: 0.9 MG/DL (ref 0.5–1.4)

## 2017-05-05 PROCEDURE — 700111 HCHG RX REV CODE 636 W/ 250 OVERRIDE (IP): Performed by: NURSE PRACTITIONER

## 2017-05-05 PROCEDURE — 36415 COLL VENOUS BLD VENIPUNCTURE: CPT

## 2017-05-05 PROCEDURE — 82565 ASSAY OF CREATININE: CPT

## 2017-05-05 PROCEDURE — 96365 THER/PROPH/DIAG IV INF INIT: CPT

## 2017-05-05 PROCEDURE — 82330 ASSAY OF CALCIUM: CPT

## 2017-05-05 RX ORDER — ZOLEDRONIC ACID 5 MG/100ML
5 INJECTION, SOLUTION INTRAVENOUS ONCE
Status: COMPLETED | OUTPATIENT
Start: 2017-05-05 | End: 2017-05-05

## 2017-05-05 RX ADMIN — ZOLEDRONIC ACID 5 MG: 0.05 INJECTION, SOLUTION INTRAVENOUS at 10:21

## 2017-05-05 ASSESSMENT — PAIN SCALES - GENERAL: PAINLEVEL: NO PAIN

## 2017-05-05 NOTE — PROGRESS NOTES
Mrs Arreola arrives to infusion services for reclast infusion. Cinthia oriented to infusion center and unit routine. Patient denies dental procedures. Labs drawn today. Creatinine and calcium WNL. Education done on reclast and side effects with verbalized understanding. Reclast information booklet given. Cinthia aware to take recommended daily doses of vitamin D and calcium. Reclast infused as ordered.Cinthia tolerated well and without incident. Cinthia DC'd home in good  Condition with . Appointment given for next treatment.

## 2017-05-05 NOTE — PROGRESS NOTES
Pharmacy note  Cr = 0.9 mg/dL, CrCl ~ 52 ml/min  Ionized Ca = 1.2  Last Reclast 04/07/16   OK for zoledronic acid (Reclast) 5 mg IV today.    Ann-Marie Shah, PharmD

## 2017-05-18 ENCOUNTER — RX ONLY (OUTPATIENT)
Age: 77
Setting detail: RX ONLY
End: 2017-05-18

## 2017-09-20 DIAGNOSIS — L98.8 WRINKLES: ICD-10-CM

## 2017-09-20 RX ORDER — TRETINOIN 0.5 MG/G
20 CREAM TOPICAL DAILY
Qty: 60 G | Refills: 3 | Status: SHIPPED | OUTPATIENT
Start: 2017-09-20 | End: 2017-09-20 | Stop reason: SDUPTHER

## 2017-09-20 RX ORDER — TRETINOIN 0.5 MG/G
20 CREAM TOPICAL DAILY
Qty: 60 G | Refills: 3 | Status: SHIPPED
Start: 2017-09-20 | End: 2018-05-14 | Stop reason: SDUPTHER

## 2017-11-03 DIAGNOSIS — I10 ESSENTIAL HYPERTENSION: ICD-10-CM

## 2017-11-03 DIAGNOSIS — E78.5 HYPERLIPIDEMIA WITH TARGET LDL LESS THAN 130: ICD-10-CM

## 2017-11-03 NOTE — TELEPHONE ENCOUNTER
Was the patient seen in the last year in this department? Yes     Does patient have an active prescription for medications requested? No     Received Request Via: Pharmacy     Last seen: 03/21/2017

## 2017-11-04 RX ORDER — LISINOPRIL 10 MG/1
10 TABLET ORAL DAILY
Qty: 90 TAB | Refills: 0 | Status: SHIPPED | OUTPATIENT
Start: 2017-11-04 | End: 2017-12-11 | Stop reason: SDUPTHER

## 2017-11-04 RX ORDER — TRIAMTERENE AND HYDROCHLOROTHIAZIDE 37.5; 25 MG/1; MG/1
1 CAPSULE ORAL DAILY
Qty: 90 CAP | Refills: 0 | Status: SHIPPED | OUTPATIENT
Start: 2017-11-04 | End: 2017-12-11 | Stop reason: SDUPTHER

## 2017-12-06 RX ORDER — ATORVASTATIN CALCIUM 20 MG/1
TABLET, FILM COATED ORAL
Qty: 90 TAB | Refills: 3 | Status: SHIPPED | OUTPATIENT
Start: 2017-12-06 | End: 2018-12-18 | Stop reason: SDUPTHER

## 2017-12-11 DIAGNOSIS — M85.80 OSTEOPENIA, UNSPECIFIED LOCATION: ICD-10-CM

## 2017-12-11 DIAGNOSIS — R01.1 CARDIAC MURMUR: ICD-10-CM

## 2017-12-11 DIAGNOSIS — I10 ESSENTIAL HYPERTENSION: ICD-10-CM

## 2017-12-11 DIAGNOSIS — M48.56XS COMPRESSION FRACTURE OF LUMBAR SPINE, NON-TRAUMATIC, SEQUELA: ICD-10-CM

## 2017-12-11 DIAGNOSIS — E78.5 HYPERLIPIDEMIA WITH TARGET LDL LESS THAN 130: ICD-10-CM

## 2017-12-11 RX ORDER — LISINOPRIL 10 MG/1
10 TABLET ORAL DAILY
Qty: 90 TAB | Refills: 3 | Status: SHIPPED | OUTPATIENT
Start: 2017-12-11 | End: 2018-12-18 | Stop reason: SDUPTHER

## 2017-12-11 RX ORDER — MULTIVIT-MIN/IRON/FOLIC ACID/K 18-600-40
2000 CAPSULE ORAL DAILY
Qty: 90 CAP | Refills: 3 | Status: SHIPPED | OUTPATIENT
Start: 2017-12-11

## 2017-12-11 RX ORDER — TRIAMTERENE AND HYDROCHLOROTHIAZIDE 37.5; 25 MG/1; MG/1
1 CAPSULE ORAL DAILY
Qty: 90 CAP | Refills: 3 | Status: SHIPPED | OUTPATIENT
Start: 2017-12-11 | End: 2018-12-18 | Stop reason: SDUPTHER

## 2018-03-01 DIAGNOSIS — Z12.31 ENCOUNTER FOR SCREENING MAMMOGRAM FOR MALIGNANT NEOPLASM OF BREAST: ICD-10-CM

## 2018-03-22 NOTE — TELEPHONE ENCOUNTER
Your mammogram shows dense breasts.  That may hide some masses.  We recommend a sono cine.  That is a way to look more closely at the breast tissue.  Typically insurance does not pay for this test.  It costs about $125.00.  Let me know if she would like me to order this test.     no musculoskeletal pain, no weakness.

## 2018-04-26 ENCOUNTER — APPOINTMENT (RX ONLY)
Dept: URBAN - METROPOLITAN AREA CLINIC 20 | Facility: CLINIC | Age: 78
Setting detail: DERMATOLOGY
End: 2018-04-26

## 2018-04-26 DIAGNOSIS — D18.0 HEMANGIOMA: ICD-10-CM

## 2018-04-26 DIAGNOSIS — D22 MELANOCYTIC NEVI: ICD-10-CM

## 2018-04-26 DIAGNOSIS — L57.8 OTHER SKIN CHANGES DUE TO CHRONIC EXPOSURE TO NONIONIZING RADIATION: ICD-10-CM

## 2018-04-26 DIAGNOSIS — L57.0 ACTINIC KERATOSIS: ICD-10-CM

## 2018-04-26 DIAGNOSIS — L82.1 OTHER SEBORRHEIC KERATOSIS: ICD-10-CM

## 2018-04-26 DIAGNOSIS — L81.4 OTHER MELANIN HYPERPIGMENTATION: ICD-10-CM

## 2018-04-26 PROBLEM — I10 ESSENTIAL (PRIMARY) HYPERTENSION: Status: ACTIVE | Noted: 2018-04-26

## 2018-04-26 PROBLEM — D22.5 MELANOCYTIC NEVI OF TRUNK: Status: ACTIVE | Noted: 2018-04-26

## 2018-04-26 PROBLEM — D18.01 HEMANGIOMA OF SKIN AND SUBCUTANEOUS TISSUE: Status: ACTIVE | Noted: 2018-04-26

## 2018-04-26 PROBLEM — E78.5 HYPERLIPIDEMIA, UNSPECIFIED: Status: ACTIVE | Noted: 2018-04-26

## 2018-04-26 PROCEDURE — ? LIQUID NITROGEN

## 2018-04-26 PROCEDURE — 17000 DESTRUCT PREMALG LESION: CPT

## 2018-04-26 PROCEDURE — 17003 DESTRUCT PREMALG LES 2-14: CPT

## 2018-04-26 PROCEDURE — 99214 OFFICE O/P EST MOD 30 MIN: CPT | Mod: 25

## 2018-04-26 PROCEDURE — ? COUNSELING

## 2018-04-26 ASSESSMENT — LOCATION DETAILED DESCRIPTION DERM
LOCATION DETAILED: RIGHT INFERIOR UPPER BACK
LOCATION DETAILED: LEFT LATERAL MALAR CHEEK
LOCATION DETAILED: RIGHT ANTERIOR PROXIMAL UPPER ARM
LOCATION DETAILED: RIGHT MID-UPPER BACK
LOCATION DETAILED: RIGHT SUPERIOR MEDIAL UPPER BACK
LOCATION DETAILED: LEFT PROXIMAL DORSAL FOREARM
LOCATION DETAILED: RIGHT PROXIMAL DORSAL FOREARM
LOCATION DETAILED: LEFT ANTERIOR PROXIMAL UPPER ARM
LOCATION DETAILED: LEFT MEDIAL SUPERIOR CHEST
LOCATION DETAILED: LEFT ANTERIOR DISTAL THIGH
LOCATION DETAILED: RIGHT CENTRAL MALAR CHEEK
LOCATION DETAILED: RIGHT ANTERIOR DISTAL THIGH
LOCATION DETAILED: LEFT INFERIOR CENTRAL MALAR CHEEK

## 2018-04-26 ASSESSMENT — LOCATION SIMPLE DESCRIPTION DERM
LOCATION SIMPLE: RIGHT FOREARM
LOCATION SIMPLE: RIGHT UPPER ARM
LOCATION SIMPLE: RIGHT UPPER BACK
LOCATION SIMPLE: LEFT CHEEK
LOCATION SIMPLE: RIGHT CHEEK
LOCATION SIMPLE: LEFT UPPER ARM
LOCATION SIMPLE: CHEST
LOCATION SIMPLE: LEFT FOREARM
LOCATION SIMPLE: LEFT THIGH
LOCATION SIMPLE: RIGHT THIGH

## 2018-04-26 ASSESSMENT — LOCATION ZONE DERM
LOCATION ZONE: ARM
LOCATION ZONE: FACE
LOCATION ZONE: TRUNK
LOCATION ZONE: LEG

## 2018-05-07 DIAGNOSIS — M48.56XS COMPRESSION FRACTURE OF LUMBAR SPINE, NON-TRAUMATIC, SEQUELA: ICD-10-CM

## 2018-05-07 DIAGNOSIS — M85.80 OSTEOPENIA, UNSPECIFIED LOCATION: ICD-10-CM

## 2018-05-07 RX ORDER — ZOLEDRONIC ACID 5 MG/100ML
5 INJECTION, SOLUTION INTRAVENOUS ONCE
Qty: 100 ML | Refills: 0 | Status: SHIPPED
Start: 2018-05-07 | End: 2018-05-07

## 2018-05-09 ENCOUNTER — HOSPITAL ENCOUNTER (OUTPATIENT)
Dept: RADIOLOGY | Facility: MEDICAL CENTER | Age: 78
End: 2018-05-09
Attending: NURSE PRACTITIONER
Payer: MEDICARE

## 2018-05-09 DIAGNOSIS — Z12.31 ENCOUNTER FOR SCREENING MAMMOGRAM FOR MALIGNANT NEOPLASM OF BREAST: ICD-10-CM

## 2018-05-09 PROCEDURE — 77067 SCR MAMMO BI INCL CAD: CPT

## 2018-05-10 ENCOUNTER — TELEPHONE (OUTPATIENT)
Dept: MEDICAL GROUP | Facility: MEDICAL CENTER | Age: 78
End: 2018-05-10

## 2018-05-11 ENCOUNTER — OUTPATIENT INFUSION SERVICES (OUTPATIENT)
Dept: ONCOLOGY | Facility: MEDICAL CENTER | Age: 78
End: 2018-05-11
Attending: NURSE PRACTITIONER
Payer: MEDICARE

## 2018-05-11 VITALS
WEIGHT: 137.79 LBS | TEMPERATURE: 98.1 F | HEIGHT: 65 IN | OXYGEN SATURATION: 100 % | BODY MASS INDEX: 22.96 KG/M2 | HEART RATE: 67 BPM | SYSTOLIC BLOOD PRESSURE: 119 MMHG | DIASTOLIC BLOOD PRESSURE: 61 MMHG | RESPIRATION RATE: 18 BRPM

## 2018-05-11 LAB
CA-I BLD ISE-SCNC: 1.15 MMOL/L (ref 1.1–1.3)
CREAT BLD-MCNC: 1 MG/DL (ref 0.5–1.4)

## 2018-05-11 PROCEDURE — 82565 ASSAY OF CREATININE: CPT

## 2018-05-11 PROCEDURE — 96365 THER/PROPH/DIAG IV INF INIT: CPT

## 2018-05-11 PROCEDURE — 82330 ASSAY OF CALCIUM: CPT

## 2018-05-11 PROCEDURE — 36415 COLL VENOUS BLD VENIPUNCTURE: CPT

## 2018-05-11 PROCEDURE — 700111 HCHG RX REV CODE 636 W/ 250 OVERRIDE (IP): Performed by: NURSE PRACTITIONER

## 2018-05-11 RX ORDER — ZOLEDRONIC ACID 5 MG/100ML
5 INJECTION, SOLUTION INTRAVENOUS ONCE
Status: COMPLETED | OUTPATIENT
Start: 2018-05-11 | End: 2018-05-11

## 2018-05-11 RX ADMIN — ZOLEDRONIC ACID 5 MG: 0.05 INJECTION, SOLUTION INTRAVENOUS at 09:50

## 2018-05-11 ASSESSMENT — PAIN SCALES - GENERAL: PAINLEVEL_OUTOF10: 0

## 2018-05-11 NOTE — PROGRESS NOTES
Patient presents ambulatory for reclast infusion.  Patient reports feeling well and denies any s/s of infection at this time.  Patient denies having had any recent invasive dental surgeries and was educated to let her dental provider know she has received this medication should something come up after, patient verbalized understanding.  PIV established, brisk blood return noted, and lab collected per protocol.  Lab reviewed and is within parameters to proceed with treatment.  Reclast infused per MD order with no complications or adverse reactions.  Patient to see MD prior to scheduling further appts with [facility].  PIV flushed, brisk blood return noted, discontinued, gauze, and coban applied to site.  Patient left ambulatory in no distress.

## 2018-05-11 NOTE — PROGRESS NOTES
Pharmacy Reclast Note  Labs 5/11/18  Ionized calcium = 1.15  Cr = 1 est CrCL ~ 47 ml/min  Last Reclast dose = 5/5/17  Agata Levy, PharmD

## 2018-05-14 DIAGNOSIS — L98.8 WRINKLES: ICD-10-CM

## 2018-05-14 RX ORDER — TRETINOIN 0.5 MG/G
20 CREAM TOPICAL DAILY
Qty: 60 G | Refills: 3 | Status: SHIPPED
Start: 2018-05-14

## 2018-07-06 ENCOUNTER — APPOINTMENT (RX ONLY)
Dept: URBAN - METROPOLITAN AREA CLINIC 20 | Facility: CLINIC | Age: 78
Setting detail: DERMATOLOGY
End: 2018-07-06

## 2018-07-06 DIAGNOSIS — L57.0 ACTINIC KERATOSIS: ICD-10-CM | Status: RESOLVING

## 2018-07-06 PROCEDURE — 17000 DESTRUCT PREMALG LESION: CPT

## 2018-07-06 PROCEDURE — ? LIQUID NITROGEN

## 2018-07-06 ASSESSMENT — LOCATION ZONE DERM: LOCATION ZONE: FACE

## 2018-07-06 ASSESSMENT — LOCATION SIMPLE DESCRIPTION DERM: LOCATION SIMPLE: LEFT CHEEK

## 2018-07-06 ASSESSMENT — LOCATION DETAILED DESCRIPTION DERM: LOCATION DETAILED: LEFT SUPERIOR CENTRAL BUCCAL CHEEK

## 2018-12-04 ENCOUNTER — TELEPHONE (OUTPATIENT)
Dept: MEDICAL GROUP | Facility: MEDICAL CENTER | Age: 78
End: 2018-12-04

## 2018-12-04 DIAGNOSIS — I10 ESSENTIAL HYPERTENSION: ICD-10-CM

## 2018-12-04 DIAGNOSIS — E78.5 HYPERLIPIDEMIA WITH TARGET LDL LESS THAN 130: ICD-10-CM

## 2018-12-05 NOTE — TELEPHONE ENCOUNTER
----- Message from Wendie Gil sent at 11/30/2018 11:04 AM PST -----  Regarding: LAB ORDER  Contact: [phone]  REQUESTING LAB ORDER PRIOR TO APPT ON 01/14/2019. PLEASE CALL WHEN READY [phone]

## 2019-01-11 ENCOUNTER — HOSPITAL ENCOUNTER (OUTPATIENT)
Dept: LAB | Facility: MEDICAL CENTER | Age: 79
End: 2019-01-11
Attending: NURSE PRACTITIONER
Payer: MEDICARE

## 2019-01-11 DIAGNOSIS — E78.5 HYPERLIPIDEMIA WITH TARGET LDL LESS THAN 130: ICD-10-CM

## 2019-01-11 DIAGNOSIS — I10 ESSENTIAL HYPERTENSION: ICD-10-CM

## 2019-01-11 LAB
ALBUMIN SERPL BCP-MCNC: 4.2 G/DL (ref 3.2–4.9)
ALBUMIN/GLOB SERPL: 1.5 G/DL
ALP SERPL-CCNC: 40 U/L (ref 30–99)
ALT SERPL-CCNC: 19 U/L (ref 2–50)
ANION GAP SERPL CALC-SCNC: 6 MMOL/L (ref 0–11.9)
AST SERPL-CCNC: 21 U/L (ref 12–45)
BILIRUB SERPL-MCNC: 0.4 MG/DL (ref 0.1–1.5)
BUN SERPL-MCNC: 28 MG/DL (ref 8–22)
CALCIUM SERPL-MCNC: 9.6 MG/DL (ref 8.5–10.5)
CHLORIDE SERPL-SCNC: 109 MMOL/L (ref 96–112)
CHOLEST SERPL-MCNC: 151 MG/DL (ref 100–199)
CO2 SERPL-SCNC: 28 MMOL/L (ref 20–33)
CREAT SERPL-MCNC: 0.96 MG/DL (ref 0.5–1.4)
CREAT UR-MCNC: 94.3 MG/DL
FASTING STATUS PATIENT QL REPORTED: NORMAL
GLOBULIN SER CALC-MCNC: 2.8 G/DL (ref 1.9–3.5)
GLUCOSE SERPL-MCNC: 97 MG/DL (ref 65–99)
HDLC SERPL-MCNC: 52 MG/DL
LDLC SERPL CALC-MCNC: 72 MG/DL
MICROALBUMIN UR-MCNC: 0.9 MG/DL
MICROALBUMIN/CREAT UR: 10 MG/G (ref 0–30)
POTASSIUM SERPL-SCNC: 4.1 MMOL/L (ref 3.6–5.5)
PROT SERPL-MCNC: 7 G/DL (ref 6–8.2)
SODIUM SERPL-SCNC: 143 MMOL/L (ref 135–145)
TRIGL SERPL-MCNC: 137 MG/DL (ref 0–149)

## 2019-01-11 PROCEDURE — 82570 ASSAY OF URINE CREATININE: CPT

## 2019-01-11 PROCEDURE — 36415 COLL VENOUS BLD VENIPUNCTURE: CPT

## 2019-01-11 PROCEDURE — 82043 UR ALBUMIN QUANTITATIVE: CPT

## 2019-01-11 PROCEDURE — 80061 LIPID PANEL: CPT

## 2019-01-11 PROCEDURE — 80053 COMPREHEN METABOLIC PANEL: CPT

## 2019-01-17 ENCOUNTER — OFFICE VISIT (OUTPATIENT)
Dept: MEDICAL GROUP | Facility: MEDICAL CENTER | Age: 79
End: 2019-01-17
Payer: MEDICARE

## 2019-01-17 ENCOUNTER — TELEPHONE (OUTPATIENT)
Dept: MEDICAL GROUP | Facility: MEDICAL CENTER | Age: 79
End: 2019-01-17

## 2019-01-17 VITALS
HEIGHT: 65 IN | TEMPERATURE: 97.8 F | SYSTOLIC BLOOD PRESSURE: 110 MMHG | DIASTOLIC BLOOD PRESSURE: 62 MMHG | HEART RATE: 73 BPM | OXYGEN SATURATION: 99 % | BODY MASS INDEX: 22.49 KG/M2 | WEIGHT: 135 LBS

## 2019-01-17 DIAGNOSIS — I10 ESSENTIAL HYPERTENSION: ICD-10-CM

## 2019-01-17 DIAGNOSIS — E78.5 HYPERLIPIDEMIA WITH TARGET LDL LESS THAN 130: ICD-10-CM

## 2019-01-17 DIAGNOSIS — B00.9 HSV INFECTION: ICD-10-CM

## 2019-01-17 DIAGNOSIS — M85.89 OSTEOPENIA OF MULTIPLE SITES: ICD-10-CM

## 2019-01-17 DIAGNOSIS — S32.000S CLOSED COMPRESSION FRACTURE OF LUMBAR VERTEBRA, SEQUELA: ICD-10-CM

## 2019-01-17 DIAGNOSIS — R01.1 CARDIAC MURMUR: ICD-10-CM

## 2019-01-17 PROCEDURE — G0439 PPPS, SUBSEQ VISIT: HCPCS | Performed by: NURSE PRACTITIONER

## 2019-01-17 RX ORDER — ATORVASTATIN CALCIUM 20 MG/1
20 TABLET, FILM COATED ORAL
Qty: 90 TAB | Refills: 3 | Status: SHIPPED | OUTPATIENT
Start: 2019-01-17 | End: 2019-01-17 | Stop reason: SDUPTHER

## 2019-01-17 RX ORDER — ATORVASTATIN CALCIUM 20 MG/1
20 TABLET, FILM COATED ORAL
Qty: 90 TAB | Refills: 0 | Status: SHIPPED
Start: 2019-01-17 | End: 2020-02-03 | Stop reason: SDUPTHER

## 2019-01-17 RX ORDER — VALACYCLOVIR HYDROCHLORIDE 500 MG/1
500 TABLET, FILM COATED ORAL 2 TIMES DAILY PRN
Qty: 180 TAB | Refills: 3 | Status: SHIPPED | OUTPATIENT
Start: 2019-01-17 | End: 2020-02-03

## 2019-01-17 RX ORDER — TRIAMTERENE AND HYDROCHLOROTHIAZIDE 37.5; 25 MG/1; MG/1
1 CAPSULE ORAL
Qty: 90 CAP | Refills: 3 | Status: SHIPPED | OUTPATIENT
Start: 2019-01-17 | End: 2019-12-07 | Stop reason: SDUPTHER

## 2019-01-17 RX ORDER — LISINOPRIL 10 MG/1
10 TABLET ORAL
Qty: 90 TAB | Refills: 3 | Status: SHIPPED | OUTPATIENT
Start: 2019-01-17 | End: 2019-12-07 | Stop reason: SDUPTHER

## 2019-01-17 ASSESSMENT — ENCOUNTER SYMPTOMS: GENERAL WELL-BEING: EXCELLENT

## 2019-01-17 ASSESSMENT — PATIENT HEALTH QUESTIONNAIRE - PHQ9: CLINICAL INTERPRETATION OF PHQ2 SCORE: 0

## 2019-01-17 ASSESSMENT — ACTIVITIES OF DAILY LIVING (ADL): BATHING_REQUIRES_ASSISTANCE: 0

## 2019-01-17 NOTE — LETTER
January 17, 2019        Cinthia Mendenhallston  [address] [place] [address]        Current Outpatient Prescriptions   Medication Sig Dispense Refill   • atorvastatin (LIPITOR) 20 MG Tab TAKE 1 TABLET BY MOUTH  EVERY DAY 90 Tab 1   • lisinopril (PRINIVIL) 10 MG Tab TAKE 1 TABLET BY MOUTH  EVERY DAY 90 Tab 1   • triamterene/hctz (MAXZIDE-25/DYAZIDE) 37.5-25 MG Cap TAKE 1 CAPSULE BY MOUTH  EVERY DAY 90 Cap 1   • tretinoin (RETIN-A) 0.05 % cream Apply 20 g to affected area(s) every day. Apply to face 60 g 3   • Cholecalciferol (VITAMIN D) 2000 units Cap Take 1 Cap by mouth every day. 90 Cap 3   • valacyclovir (VALTREX) 500 MG Tab Take 1 Tab by mouth 2 times a day as needed. 180 Tab 1   • aspirin EC (ECOTRIN) 81 MG TBEC Take 81 mg by mouth every day.     • folic acid (FOLVITE) 1 MG TABS Take 1 mg by mouth every day.     • magnesium chloride SR (SLO-MAG) 535 (64 MG) MG TBCR Take 535 mg by mouth every day.     • coenzyme Q-10 30 MG capsule Take 60 mg by mouth every day.     • Selenium (SELENIMIN PO) Take  by mouth.     • Ginkgo Biloba 40 MG TABS Take  by mouth.     • MULTIPLE VITAMIN PO Take  by mouth.     • Glucosamine-Chondroit-Vit C-Mn (GLUCOSAMINE CHONDR 1500 COMPLX PO) Take  by mouth.     • Flaxseed, Linseed, (FLAX SEED OIL) 1000 MG CAPS Take  by mouth.     • ascorbic acid (ASCORBIC ACID) 500 MG TABS Take 500 mg by mouth every day.     • Probiotic Product (PROBIOTIC DAILY PO) Take 1 Tab by mouth every day.     • Biotin 1000 MCG TABS Take  by mouth.     • Calcium Citrate-Vitamin D (CITRACAL + D PO) Take  by mouth.     • cyanocobalamin (VITAMIN B-12) 100 MCG TABS Take 1,000 mcg by mouth every day.     • pyridoxine (VITAMIN B-6) 50 MG TABS Take 100 mg by mouth every day.     • docosahexanoic acid (OMEGA 3 FA) 1000 MG CAPS Take 1,000 mg by mouth 2 times a day, with meals.       No current facility-administered medications for this visit.

## 2019-01-17 NOTE — ASSESSMENT & PLAN NOTE
She is on ca++ bid.  Taking d3 2000 units daily.  Does weight bearing exercise.  No hx fx.  Due for updated dexa

## 2019-01-17 NOTE — PROGRESS NOTES
Subjective:      Cinthia Arreola is a 78 y.o. female who presents with           HPI  Seen in fu/ for HRA.  She is feeling well.  Reviewed lab with pt.  Her LP, alb/cr ratio, CMP is wnl.  GFR sl dec at 56.  She feels that she doesn't drink enough water.     Osteopenia of multiple sites  She is on ca++ bid.  Taking d3 2000 units daily.  Does weight bearing exercise.  No hx fx.  Due for updated dexa    Hypertension, essential  Stable and controlled on meds    Hyperlipidemia with target LDL less than 130  Very stable on lipitor.  Refill meds.  LDL down from 152 before lipitor to low now.      Compression fracture of lumbar spine, non-traumatic  This occurred in past.  No current sx or tx needed    Cardiac murmur  No current sx.  Not heard long term.    HSV infection  Uses valtrex prn.  Needs refill.  Stable on meds.  No current sx          ROS  Review of Systems   Constitutional: Negative.  Negative for fever, chills, weight loss, malaise/fatigue and diaphoresis.   HENT: Negative.  Negative for hearing loss, ear pain, nosebleeds, congestion, sore throat, neck pain, tinnitus and ear discharge.    Eyes: Negative.  Negative for blurred vision, double vision, photophobia, pain, discharge and redness.   Respiratory: Negative.  Negative for cough, hemoptysis, sputum production, shortness of breath, wheezing and stridor.    Cardiovascular: Negative.  Negative for chest pain, palpitations, orthopnea, claudication, leg swelling and PND.   Gastrointestinal: Negative.  Negative for heartburn, nausea, vomiting, abdominal pain, diarrhea, constipation, blood in stool and melena.   Genitourinary: Negative.  Negative for dysuria, urgency, frequency, incontinence, hematuria and flank pain.   Musculoskeletal: Negative.  Negative for myalgias, back pain, joint pain and falls.   occas left leg weakness.  She r/t old injury  Skin: Negative.  Negative for itching and rash.   Neurological: Negative.  Negative for dizziness, tingling,  tremors, sensory change, speech change, focal weakness, seizures, loss of consciousness, weakness and headaches.   Endo/Heme/Allergies: Negative.  Negative for environmental allergies and polydipsia. Does not bruise/bleed easily.   Psychiatric/Behavioral: Negative.  Negative for depression, suicidal ideas, hallucinations, memory loss and substance abuse. The patient is not nervous/anxious and does not have insomnia.    All other systems reviewed and are negative.      HPI:  Cinthia Arreola is a 78 y.o. here for Medicare Annual Wellness Visit         Patient Active Problem List    Diagnosis Date Noted   • Preventative health care 06/05/2013   • Osteopenia of multiple sites    • Cardiac murmur    • Hypertension, essential    • Hyperlipidemia with target LDL less than 130    • Compression fracture of lumbar spine, non-traumatic (HCC) 11/26/2012       Current Outpatient Prescriptions   Medication Sig Dispense Refill   • valACYclovir (VALTREX) 500 MG Tab Take 1 Tab by mouth 2 times a day as needed. 180 Tab 3   • triamterene/hctz (MAXZIDE-25/DYAZIDE) 37.5-25 MG Cap Take 1 Cap by mouth every day. 90 Cap 3   • lisinopril (PRINIVIL) 10 MG Tab Take 1 Tab by mouth every day. 90 Tab 3   • atorvastatin (LIPITOR) 20 MG Tab Take 1 Tab by mouth every day. 90 Tab 0   • tretinoin (RETIN-A) 0.05 % cream Apply 20 g to affected area(s) every day. Apply to face 60 g 3   • Cholecalciferol (VITAMIN D) 2000 units Cap Take 1 Cap by mouth every day. 90 Cap 3   • aspirin EC (ECOTRIN) 81 MG TBEC Take 81 mg by mouth every day.     • folic acid (FOLVITE) 1 MG TABS Take 1 mg by mouth every day.     • magnesium chloride SR (SLO-MAG) 535 (64 MG) MG TBCR Take 535 mg by mouth every day.     • coenzyme Q-10 30 MG capsule Take 60 mg by mouth every day.     • Selenium (SELENIMIN PO) Take  by mouth.     • Ginkgo Biloba 40 MG TABS Take  by mouth.     • MULTIPLE VITAMIN PO Take  by mouth.     • Glucosamine-Chondroit-Vit C-Mn (GLUCOSAMINE CHONDR 1500 COMPLX  PO) Take  by mouth.     • Flaxseed, Linseed, (FLAX SEED OIL) 1000 MG CAPS Take  by mouth.     • ascorbic acid (ASCORBIC ACID) 500 MG TABS Take 500 mg by mouth every day.     • Probiotic Product (PROBIOTIC DAILY PO) Take 1 Tab by mouth every day.     • Biotin 1000 MCG TABS Take  by mouth.     • Calcium Citrate-Vitamin D (CITRACAL + D PO) Take  by mouth.     • cyanocobalamin (VITAMIN B-12) 100 MCG TABS Take 1,000 mcg by mouth every day.     • pyridoxine (VITAMIN B-6) 50 MG TABS Take 100 mg by mouth every day.     • docosahexanoic acid (OMEGA 3 FA) 1000 MG CAPS Take 1,000 mg by mouth 2 times a day, with meals.       No current facility-administered medications for this visit.             Current supplements as per medication list.       Allergies: Patient has no known allergies.    Current social contact/activities:   1.  Bridge  2.  Out to eat/socialize  3.  Reading  4.  Going to gym   5.  tennis    She  reports that she quit smoking about 39 years ago. She has never used smokeless tobacco. She reports that she drinks about 2.0 oz of alcohol per week . She reports that she does not use drugs.  Counseling given: Not Answered      DPA/Advanced Directive:  Patient has Advanced Directive, Living Will and Durable Power of , but it is not on file. Instructed to bring in a copy to scan into their chart.    ROS:    Gait: Uses no assistive device  Ostomy: No  Other tubes: No  Amputations: No  Chronic oxygen use: No  Last eye exam: 1.5 yrs ago  Wears hearing aids: No   : Denies any urinary leakage during the last 6 months    Screening:  ? Consider hep b tites.  They have had vaccines  Depression Screening    Little interest or pleasure in doing things?  0 - not at all  Feeling down, depressed , or hopeless? 0 - not at all  Patient Health Questionnaire Score: 0     If depressive symptoms identified deferred to follow up visit unless specifically addressed in assessment and plan.    Interpretation of PHQ-9 Total Score    Score Severity   1-4 No Depression   5-9 Mild Depression   10-14 Moderate Depression   15-19 Moderately Severe Depression   20-27 Severe Depression    Screening for Cognitive Impairment    Three Minute Recall (leader, season, table) 3/3    Luis clock face with all 12 numbers and set the hands to show 10 past 11.  Yes    Cognitive concerns identified deferred for follow up unless specifically addressed in assessment and plan.    Fall Risk Assessment    Has the patient had two or more falls in the last year or any fall with injury in the last year?  No    Safety Assessment    Throw rugs on floor.  Yes  Handrails on all stairs.  Yes  Good lighting in all hallways.  Yes  Difficulty hearing.  No  Patient counseled about all safety risks that were identified.    Functional Assessment ADLs    Are there any barriers preventing you from cooking for yourself or meeting nutritional needs?  No.    Are there any barriers preventing you from driving safely or obtaining transportation?  No.    Are there any barriers preventing you from using a telephone or calling for help?  No.    Are there any barriers preventing you from shopping?  No.    Are there any barriers preventing you from taking care of your own finances?  No.    Are there any barriers preventing you from managing your medications?  No.    Are there any barriers preventing you from showering, bathing or dressing yourself?  No.    Are you currently engaging in any exercise or physical activity?  Yes.     What is your perception of your health?  Excellent.      Health Maintenance Summary                IMM ZOSTER VACCINES Overdue 11/26/2015      Done 10/1/2015 Imm Admin: Zoster Vaccine Live (ZVL) (Zostavax)    BONE DENSITY Next Due 3/20/2019      Done 3/20/2017 DS-BONE DENSITY STUDY (DEXA)     Patient has more history with this topic...    MAMMOGRAM Next Due 5/9/2019      Done 5/9/2018 MA-MAMMO SCREENING BILAT W/TOMOSYNTHESIS W/CAD     Patient has more history with  this topic...    Annual Wellness Visit Next Due 1/18/2020      Done 1/17/2019      Patient has more history with this topic...    IMM DTaP/Tdap/Td Vaccine Next Due 11/21/2023      Done 11/21/2013 Imm Admin: Tdap Vaccine    COLONOSCOPY Next Due 1/8/2024      Done 1/8/2014 AMB REFERRAL TO GI FOR COLONOSCOPY          Patient Care Team:  SGAAR Saeed as PCP - General (Family Medicine)        Social History   Substance Use Topics   • Smoking status: Former Smoker     Quit date: 1/1/1980   • Smokeless tobacco: Never Used   • Alcohol use 2.0 oz/week     4 Glasses of wine per week     Family History   Problem Relation Age of Onset   • Stroke Mother    • Hypertension Mother    • Hyperlipidemia Mother    • Diabetes Mother    • Stroke Father    • Hypertension Father    • Diabetes Father    • Diabetes Brother    • Cancer Maternal Grandfather    • Lung Disease Paternal Grandfather      She  has a past medical history of Cardiac murmur; Compression fracture of lumbar spine, non-traumatic (HCC) (11/26/12); Hyperlipidemia LDL goal < 130; Hypertension; and Osteopenia. She also has no past medical history of Breast cancer (HCC).   Past Surgical History:   Procedure Laterality Date   • CATARACT EXTRACTION WITH IOL  2012    celso eyes   • NASAL ENDOSCOPY  2008    removal of clot after fall   • KNEE ARTHROSCOPY      meniscus repair celso knees   • RHINOPLASTY     • TONSILLECTOMY AND ADENOIDECTOMY  age 3   • TUBAL COAGULATION LAPAROSCOPIC BILATERAL       Services suggested: none needed  Health Care Screening: Age-appropriate preventive services recommended by USPTF and ACIP covered by Medicare were discussed today. Services ordered if indicated and agreed upon by the patient.  Referrals offered: Community-based lifestyle interventions to reduce health risks and promote self-management and wellness, fall prevention, nutrition, physical activity, tobacco-use cessation, weight loss, and mental health services as per orders if  "indicated.    Discussion today about general wellness and lifestyle habits:    · Prevent falls and reduce trip hazards; Cautioned about securing or removing rugs.  · Have a working fire alarm and carbon monoxide detector;   · Engage in regular physical activity and social activities            Objective:     /62 (BP Location: Right arm, Patient Position: Sitting)   Pulse 73   Temp 36.6 °C (97.8 °F) (Temporal)   Ht 1.655 m (5' 5.16\")   Wt 61.2 kg (135 lb)   SpO2 99%   BMI 22.35 kg/m²      Physical Exam  Exam:   Blood pressure 110/62, pulse 73, temperature 36.6 °C (97.8 °F), temperature source Temporal, height 1.655 m (5' 5.16\"), weight 61.2 kg (135 lb), SpO2 99 %, not currently breastfeeding. Body mass index is 22.35 kg/m².    Hearing excellent.    Dentition good  Alert, oriented in no acute distress.  Eye contact is good, speech goal directed, affect calm  Physical Exam   Vitals reviewed.  Constitutional: oriented to person, place, and time. appears well-developed and well-nourished. No distress.   HENT: Head: Normocephalic and atraumatic. Bilateral tympanic membranes wnl w/o bulging.  Right Ear: External ear normal. Left Ear: External ear normal. Nose: Nose normal.  Mouth/Throat: Oropharynx is clear and moist. No oropharyngeal exudate. celso tm wnl. Eyes: Conjunctivae and EOM are normal. Pupils are equal, round, and reactive to light. Right eye exhibits no discharge. Left eye exhibits no discharge. No scleral icterus.    Neck: Normal range of motion. Neck supple. No JVD present.   Cardiovascular: Normal rate, regular rhythm, normal heart sounds and intact distal pulses.  Exam reveals no gallop and no friction rub.  No murmur heard.  No carotid bruits   Pulmonary/Chest: Effort normal and breath sounds normal. No stridor. No respiratory distress. no wheezes or rales. exhibits no tenderness.   Abdominal: Soft. Bowel sounds are normal. exhibits no distension and no mass. No tenderness. no rebound and no " guarding.   Musculoskeletal: Normal range of motion. exhibits no edema or tenderness.  celso pedal pulses 2+.  Lymphadenopathy:  no cervical or supraclavicular adenopathy.   Neurological: alert and oriented to person, place, and time. has normal reflexes. displays normal reflexes. No cranial nerve deficit. exhibits normal muscle tone. Coordination normal.   Skin: Skin is warm and dry. No rash noted. no diaphoresis. No erythema. No pallor.   Psychiatric: normal mood and affect. behavior is normal.               Assessment/Plan:     1. HSV infection  valACYclovir (VALTREX) 500 MG Tab    Subsequent Annual Wellness Visit - Includes PPPS ()    stable on prn meds.  refilled all meds   2. Essential hypertension  triamterene/hctz (MAXZIDE-25/DYAZIDE) 37.5-25 MG Cap    lisinopril (PRINIVIL) 10 MG Tab    Subsequent Annual Wellness Visit - Includes PPPS ()    stable on meds.  refilled all meds  alb/cr ratio is wnl.  f/u yearly.  call for lab slip   3. Hyperlipidemia with target LDL less than 130  triamterene/hctz (MAXZIDE-25/DYAZIDE) 37.5-25 MG Cap    lisinopril (PRINIVIL) 10 MG Tab    Subsequent Annual Wellness Visit - Includes PPPS ()    stable on lipitor.  LP is wnl   4. Osteopenia of multiple sites  Subsequent Annual Wellness Visit - Includes PPPS ()    DS-BONE DENSITY STUDY (DEXA)    she will call when back in town for redo reclast 5/19.  due for updated dexa.  hx of nontraumatic compression fx   5. Cardiac murmur  Subsequent Annual Wellness Visit - Includes PPPS ()    no current sx or murmur noted on exam.  no tx needed   6. Closed compression fracture of lumbar vertebra, sequela      this was found on old studies.  no pain.  no sx.  she will continue yearly reclast in 5/19.

## 2019-02-21 RX ORDER — ACYCLOVIR 400 MG/1
400 TABLET ORAL 2 TIMES DAILY
Qty: 180 TAB | Refills: 1 | Status: SHIPPED | OUTPATIENT
Start: 2019-02-21 | End: 2019-12-28 | Stop reason: SDUPTHER

## 2019-04-17 ENCOUNTER — RX ONLY (OUTPATIENT)
Age: 79
Setting detail: RX ONLY
End: 2019-04-17

## 2019-04-17 RX ORDER — DOXYCYCLINE HYCLATE 20 MG/1
TABLET, FILM COATED ORAL
Qty: 60 | Refills: 2 | Status: ERX | COMMUNITY
Start: 2019-04-17

## 2019-04-17 RX ORDER — METRONIDAZOLE 10 MG/G
GEL TOPICAL
Qty: 1 | Refills: 2 | Status: ERX | COMMUNITY
Start: 2019-04-17

## 2019-04-22 ENCOUNTER — RX ONLY (OUTPATIENT)
Age: 79
Setting detail: RX ONLY
End: 2019-04-22

## 2019-04-22 DIAGNOSIS — Z12.11 SCREEN FOR COLON CANCER: ICD-10-CM

## 2019-04-22 RX ORDER — DOXYCYCLINE HYCLATE 20 MG/1
TABLET, FILM COATED ORAL
Qty: 60 | Refills: 2 | Status: ERX

## 2019-04-22 RX ORDER — METRONIDAZOLE 10 MG/G
GEL TOPICAL
Qty: 1 | Refills: 2 | Status: ERX

## 2019-04-29 ENCOUNTER — HOSPITAL ENCOUNTER (OUTPATIENT)
Facility: MEDICAL CENTER | Age: 79
End: 2019-04-29
Attending: NURSE PRACTITIONER
Payer: MEDICARE

## 2019-04-29 PROCEDURE — 82274 ASSAY TEST FOR BLOOD FECAL: CPT

## 2019-05-01 DIAGNOSIS — Z12.11 SCREEN FOR COLON CANCER: ICD-10-CM

## 2019-05-01 LAB — HEMOCCULT STL QL IA: NEGATIVE

## 2019-05-02 ENCOUNTER — APPOINTMENT (RX ONLY)
Dept: URBAN - METROPOLITAN AREA CLINIC 20 | Facility: CLINIC | Age: 79
Setting detail: DERMATOLOGY
End: 2019-05-02

## 2019-05-02 DIAGNOSIS — Z41.9 ENCOUNTER FOR PROCEDURE FOR PURPOSES OTHER THAN REMEDYING HEALTH STATE, UNSPECIFIED: ICD-10-CM

## 2019-05-02 PROCEDURE — ? COSMETIC CONSULTATION: BBL

## 2019-06-18 DIAGNOSIS — N95.9 POST MENOPAUSAL PROBLEMS: ICD-10-CM

## 2019-06-18 DIAGNOSIS — M85.89 OSTEOPENIA OF MULTIPLE SITES: ICD-10-CM

## 2019-07-24 ENCOUNTER — RX ONLY (OUTPATIENT)
Age: 79
Setting detail: RX ONLY
End: 2019-07-24

## 2019-07-24 RX ORDER — DOXYCYCLINE HYCLATE 20 MG/1
TABLET, FILM COATED ORAL
Qty: 60 | Refills: 2 | Status: ERX

## 2019-07-25 ENCOUNTER — APPOINTMENT (RX ONLY)
Dept: URBAN - METROPOLITAN AREA CLINIC 20 | Facility: CLINIC | Age: 79
Setting detail: DERMATOLOGY
End: 2019-07-25

## 2019-08-22 ENCOUNTER — HOSPITAL ENCOUNTER (OUTPATIENT)
Dept: RADIOLOGY | Facility: MEDICAL CENTER | Age: 79
End: 2019-08-22
Attending: NURSE PRACTITIONER
Payer: MEDICARE

## 2019-08-22 DIAGNOSIS — M85.89 OSTEOPENIA OF MULTIPLE SITES: ICD-10-CM

## 2019-08-22 DIAGNOSIS — N95.9 POST MENOPAUSAL PROBLEMS: ICD-10-CM

## 2019-08-22 DIAGNOSIS — Z12.31 VISIT FOR SCREENING MAMMOGRAM: ICD-10-CM

## 2019-08-22 PROCEDURE — 77063 BREAST TOMOSYNTHESIS BI: CPT

## 2019-08-22 PROCEDURE — 77080 DXA BONE DENSITY AXIAL: CPT

## 2019-08-23 ENCOUNTER — TELEPHONE (OUTPATIENT)
Dept: MEDICAL GROUP | Facility: MEDICAL CENTER | Age: 79
End: 2019-08-23

## 2019-08-26 ENCOUNTER — APPOINTMENT (RX ONLY)
Dept: URBAN - METROPOLITAN AREA CLINIC 20 | Facility: CLINIC | Age: 79
Setting detail: DERMATOLOGY
End: 2019-08-26

## 2019-08-26 DIAGNOSIS — L81.4 OTHER MELANIN HYPERPIGMENTATION: ICD-10-CM

## 2019-08-26 DIAGNOSIS — L57.8 OTHER SKIN CHANGES DUE TO CHRONIC EXPOSURE TO NONIONIZING RADIATION: ICD-10-CM

## 2019-08-26 DIAGNOSIS — D18.0 HEMANGIOMA: ICD-10-CM

## 2019-08-26 DIAGNOSIS — L82.1 OTHER SEBORRHEIC KERATOSIS: ICD-10-CM

## 2019-08-26 DIAGNOSIS — D22 MELANOCYTIC NEVI: ICD-10-CM

## 2019-08-26 DIAGNOSIS — L71.8 OTHER ROSACEA: ICD-10-CM | Status: WELL CONTROLLED

## 2019-08-26 PROBLEM — D18.01 HEMANGIOMA OF SKIN AND SUBCUTANEOUS TISSUE: Status: ACTIVE | Noted: 2019-08-26

## 2019-08-26 PROBLEM — D48.5 NEOPLASM OF UNCERTAIN BEHAVIOR OF SKIN: Status: ACTIVE | Noted: 2019-08-26

## 2019-08-26 PROBLEM — D22.5 MELANOCYTIC NEVI OF TRUNK: Status: ACTIVE | Noted: 2019-08-26

## 2019-08-26 PROCEDURE — 99214 OFFICE O/P EST MOD 30 MIN: CPT | Mod: 25

## 2019-08-26 PROCEDURE — ? BIOPSY BY SHAVE METHOD

## 2019-08-26 PROCEDURE — ? COUNSELING

## 2019-08-26 PROCEDURE — ? PRESCRIPTION

## 2019-08-26 PROCEDURE — 11102 TANGNTL BX SKIN SINGLE LES: CPT

## 2019-08-26 RX ORDER — DOXYCYCLINE HYCLATE 20 MG/1
TABLET, FILM COATED ORAL
Qty: 60 | Refills: 3 | Status: ERX

## 2019-08-26 RX ORDER — METRONIDAZOLE 7.5 MG/G
GEL TOPICAL QHS
Qty: 1 | Refills: 5 | Status: ERX | COMMUNITY
Start: 2019-08-26

## 2019-08-26 RX ADMIN — METRONIDAZOLE: 7.5 GEL TOPICAL at 00:00

## 2019-08-26 ASSESSMENT — LOCATION SIMPLE DESCRIPTION DERM
LOCATION SIMPLE: RIGHT THIGH
LOCATION SIMPLE: NOSE
LOCATION SIMPLE: RIGHT UPPER ARM
LOCATION SIMPLE: RIGHT CHEEK
LOCATION SIMPLE: RIGHT FOREARM
LOCATION SIMPLE: CHEST
LOCATION SIMPLE: RIGHT UPPER BACK
LOCATION SIMPLE: LEFT FOREARM
LOCATION SIMPLE: LEFT THIGH
LOCATION SIMPLE: LEFT CHEEK
LOCATION SIMPLE: LEFT UPPER ARM

## 2019-08-26 ASSESSMENT — LOCATION DETAILED DESCRIPTION DERM
LOCATION DETAILED: RIGHT PROXIMAL DORSAL FOREARM
LOCATION DETAILED: RIGHT MID-UPPER BACK
LOCATION DETAILED: LEFT ANTERIOR PROXIMAL UPPER ARM
LOCATION DETAILED: LEFT ANTERIOR DISTAL THIGH
LOCATION DETAILED: RIGHT MEDIAL MALAR CHEEK
LOCATION DETAILED: RIGHT CENTRAL MALAR CHEEK
LOCATION DETAILED: LEFT PROXIMAL DORSAL FOREARM
LOCATION DETAILED: LEFT INFERIOR CENTRAL MALAR CHEEK
LOCATION DETAILED: RIGHT SUPERIOR MEDIAL UPPER BACK
LOCATION DETAILED: RIGHT ANTERIOR DISTAL THIGH
LOCATION DETAILED: RIGHT ANTERIOR PROXIMAL UPPER ARM
LOCATION DETAILED: LEFT MEDIAL SUPERIOR CHEST
LOCATION DETAILED: LEFT MEDIAL MALAR CHEEK
LOCATION DETAILED: RIGHT INFERIOR UPPER BACK
LOCATION DETAILED: NASAL DORSUM

## 2019-08-26 ASSESSMENT — LOCATION ZONE DERM
LOCATION ZONE: TRUNK
LOCATION ZONE: ARM
LOCATION ZONE: LEG
LOCATION ZONE: FACE
LOCATION ZONE: NOSE

## 2019-08-26 NOTE — PROCEDURE: BIOPSY BY SHAVE METHOD
Electrodesiccation Text: The wound bed was treated with electrodesiccation after the biopsy was performed.
Bill For Surgical Tray: no
Was A Bandage Applied: Yes
Consent: Written consent was obtained and risks were reviewed including but not limited to scarring, infection, bleeding, scabbing, incomplete removal, nerve damage and allergy to anesthesia.
Type Of Destruction Used: Curettage
Dressing: Band-Aid
Anesthesia Volume In Cc: 0.2
Electrodesiccation And Curettage Text: The wound bed was treated with electrodesiccation and curettage after the biopsy was performed.
Biopsy Method: Personna blade
Detail Level: Detailed
Curettage Text: The wound bed was treated with curettage after the biopsy was performed.
Billing Type: Third-Party Bill
Hemostasis: Drysol and Electrocautery
Silver Nitrate Text: The wound bed was treated with silver nitrate after the biopsy was performed.
Lab: [id number]
Size Of Lesion In Cm: 0
Depth Of Biopsy: dermis
Anesthesia Type: 1% lidocaine with 1:100,000 epinephrine and a 1:6 solution of 8.4% sodium bicarbonate
Post-Care Instructions: I reviewed with the patient in detail post-care instructions. Patient is to keep the biopsy site dry overnight, and then apply bacitracin twice daily until healed. Patient may apply hydrogen peroxide soaks to remove any crusting.
Cryotherapy Text: The wound bed was treated with cryotherapy after the biopsy was performed.
Notification Instructions: Patient will be notified of biopsy results. However, patient instructed to call the office if not contacted within 2 weeks.
Biopsy Type: H and E
Lab Facility: 
Wound Care: [facility]

## 2019-09-01 ENCOUNTER — TELEPHONE (OUTPATIENT)
Dept: MEDICAL GROUP | Facility: MEDICAL CENTER | Age: 79
End: 2019-09-01

## 2019-09-01 RX ORDER — ZOLEDRONIC ACID 5 MG/100ML
5 INJECTION, SOLUTION INTRAVENOUS ONCE
Qty: 100 ML | Refills: 0 | Status: SHIPPED
Start: 2019-09-01 | End: 2019-09-04 | Stop reason: SDUPTHER

## 2019-09-02 NOTE — TELEPHONE ENCOUNTER
i just did an order for reclast on pt.  Please do not send to her pharmacy.  Send to the infusion center.

## 2019-09-04 RX ORDER — ZOLEDRONIC ACID 5 MG/100ML
5 INJECTION, SOLUTION INTRAVENOUS ONCE
Qty: 100 ML | Refills: 0 | Status: SHIPPED
Start: 2019-09-04 | End: 2019-09-04

## 2019-09-05 RX ORDER — ZOLEDRONIC ACID 5 MG/100ML
5 INJECTION, SOLUTION INTRAVENOUS ONCE
Qty: 100 ML | Refills: 0 | Status: SHIPPED
Start: 2019-09-05 | End: 2019-09-05

## 2019-09-30 ENCOUNTER — OUTPATIENT INFUSION SERVICES (OUTPATIENT)
Dept: ONCOLOGY | Facility: MEDICAL CENTER | Age: 79
End: 2019-09-30
Attending: NURSE PRACTITIONER
Payer: MEDICARE

## 2019-09-30 VITALS
BODY MASS INDEX: 23.14 KG/M2 | WEIGHT: 138.89 LBS | HEIGHT: 65 IN | DIASTOLIC BLOOD PRESSURE: 63 MMHG | HEART RATE: 57 BPM | RESPIRATION RATE: 18 BRPM | TEMPERATURE: 97 F | SYSTOLIC BLOOD PRESSURE: 132 MMHG | OXYGEN SATURATION: 98 %

## 2019-09-30 DIAGNOSIS — M85.89 OSTEOPENIA OF MULTIPLE SITES: ICD-10-CM

## 2019-09-30 LAB
CA-I BLD ISE-SCNC: 1.07 MMOL/L (ref 1.1–1.3)
CREAT BLD-MCNC: 0.8 MG/DL (ref 0.5–1.4)

## 2019-09-30 PROCEDURE — 96365 THER/PROPH/DIAG IV INF INIT: CPT

## 2019-09-30 PROCEDURE — 82330 ASSAY OF CALCIUM: CPT

## 2019-09-30 PROCEDURE — 82565 ASSAY OF CREATININE: CPT

## 2019-09-30 PROCEDURE — 700111 HCHG RX REV CODE 636 W/ 250 OVERRIDE (IP): Performed by: NURSE PRACTITIONER

## 2019-09-30 PROCEDURE — 36415 COLL VENOUS BLD VENIPUNCTURE: CPT

## 2019-09-30 RX ORDER — ZOLEDRONIC ACID 5 MG/100ML
5 INJECTION, SOLUTION INTRAVENOUS ONCE
Status: COMPLETED | OUTPATIENT
Start: 2019-09-30 | End: 2019-09-30

## 2019-09-30 RX ADMIN — ZOLEDRONIC ACID 5 MG: 5 INJECTION, SOLUTION INTRAVENOUS at 15:35

## 2019-09-30 NOTE — PROGRESS NOTES
Pharmacy note  Cr = 0.8 mg/dL, CrCl ~ 58 ml/min  Ionized Ca = 1.07 - asymptomatic, recommended patient to continue taking current Vit D/Calcium supplement.      Last Reclast 05/11/18  OK for zoledronic acid (Reclast) 5 mg IV today.    Ann-Marie Shah, PharmD, BCOP

## 2019-09-30 NOTE — PROGRESS NOTES
Patient arrived ambulatory to the [facility] for Reclast. Reviewed vital signs, labs, and physician order. Patient denies S&S of infection, or recent dental surgeries/extractions. IV access established in right AC, visualized brisk blood return. Reclast administered, no adverse reaction observed. IV flushed per protocol, catheter removed with tip intact, gauze and coban dressing placed. Confirmed upcoming appointment date and time with patient. Patient left the [facility] ambulatory in no sign of distress.

## 2019-10-31 ENCOUNTER — APPOINTMENT (RX ONLY)
Dept: URBAN - METROPOLITAN AREA CLINIC 36 | Facility: CLINIC | Age: 79
Setting detail: DERMATOLOGY
End: 2019-10-31

## 2019-10-31 ENCOUNTER — RX ONLY (OUTPATIENT)
Age: 79
Setting detail: RX ONLY
End: 2019-10-31

## 2019-10-31 DIAGNOSIS — L57.0 ACTINIC KERATOSIS: ICD-10-CM

## 2019-10-31 PROCEDURE — 96567 PDT DSTR PRMLG LES SKN: CPT

## 2019-10-31 PROCEDURE — ? PDT: RED

## 2019-10-31 PROCEDURE — ? PHOTODYNAMIC THERAPY COUNSELING

## 2019-10-31 RX ORDER — ACYCLOVIR 400 MG/1
TABLET ORAL
Qty: 15 | Refills: 0 | Status: ERX | COMMUNITY
Start: 2019-10-31

## 2019-10-31 ASSESSMENT — LOCATION SIMPLE DESCRIPTION DERM: LOCATION SIMPLE: SUPERIOR FOREHEAD

## 2019-10-31 ASSESSMENT — LOCATION DETAILED DESCRIPTION DERM: LOCATION DETAILED: SUPERIOR MID FOREHEAD

## 2019-10-31 ASSESSMENT — LOCATION ZONE DERM: LOCATION ZONE: FACE

## 2019-10-31 NOTE — PROCEDURE: PDT: RED
Ndc# (Optional): [id number]
Total Number Of Aks Treated (Optional To Report): 0
Debridement Text (Will Only Render In Visit Note If You Select Debridement Option Under Who Performed The Pdt Field): Prior to application of the photodynamic medication the hyperkeratotic lesions were curetted to make them more amenable to therapy.
Consent: Written consent obtained.  The risks were reviewed with the patient including but not limited to: pigmentary changes, pain, blistering, scabbing, redness, and the remote possibility of scarring.
Number Of Kerasticks/Tubes Of Metvixia/Tubes Of Ameluz Used: 1
Anesthesia Type: 1% lidocaine with epinephrine
Who Performed The Pdt?: Performed by Nurse, MA or Aesthetician ([id number])
Lot # (Optional): [id number]
Incubation Time (Set To 00:00:00 If Not Wanted): 01:30:00
Application Method: without occlusion
Photosensitizer: Ameluz
Illumination Time: 7 min 07 sec
Post-Care Instructions: I reviewed with the patient in detail post-care instructions. Patient is to avoid sunlight for the next 2 days, and wear sun protection. Patients may expect sunburn like redness, discomfort and scabbing.
Expiration Date (Optional): 10.2020
Detail Level: Zone

## 2019-10-31 NOTE — HPI: PHOTODYNAMIC THERAPY (PDT)
Have You Had Previous Treatments With Pdt Before?: has not had previous treatments
When Outside In The Sun, Do You...: always burns, never tans
Additional History: Pt was given one tablet of Acyclovir 400mg prior to PDT due to a history of cold sores

## 2019-12-02 ENCOUNTER — APPOINTMENT (RX ONLY)
Dept: URBAN - METROPOLITAN AREA CLINIC 20 | Facility: CLINIC | Age: 79
Setting detail: DERMATOLOGY
End: 2019-12-02

## 2019-12-02 DIAGNOSIS — L82.0 INFLAMED SEBORRHEIC KERATOSIS: ICD-10-CM

## 2019-12-02 DIAGNOSIS — I78.8 OTHER DISEASES OF CAPILLARIES: ICD-10-CM

## 2019-12-02 PROCEDURE — 17110 DESTRUCTION B9 LES UP TO 14: CPT

## 2019-12-02 PROCEDURE — ? COUNSELING

## 2019-12-02 PROCEDURE — ? LIQUID NITROGEN

## 2019-12-02 PROCEDURE — 99212 OFFICE O/P EST SF 10 MIN: CPT | Mod: 25

## 2019-12-02 ASSESSMENT — LOCATION DETAILED DESCRIPTION DERM
LOCATION DETAILED: RIGHT DISTAL PRETIBIAL REGION
LOCATION DETAILED: RIGHT MEDIAL BUCCAL CHEEK

## 2019-12-02 ASSESSMENT — LOCATION ZONE DERM
LOCATION ZONE: FACE
LOCATION ZONE: LEG

## 2019-12-02 ASSESSMENT — LOCATION SIMPLE DESCRIPTION DERM
LOCATION SIMPLE: RIGHT PRETIBIAL REGION
LOCATION SIMPLE: RIGHT CHEEK

## 2019-12-07 DIAGNOSIS — I10 ESSENTIAL HYPERTENSION: ICD-10-CM

## 2019-12-07 DIAGNOSIS — E78.5 HYPERLIPIDEMIA WITH TARGET LDL LESS THAN 130: ICD-10-CM

## 2019-12-09 RX ORDER — TRIAMTERENE AND HYDROCHLOROTHIAZIDE 37.5; 25 MG/1; MG/1
CAPSULE ORAL
Qty: 90 CAP | Refills: 0 | Status: SHIPPED | OUTPATIENT
Start: 2019-12-09 | End: 2020-02-03 | Stop reason: SDUPTHER

## 2019-12-09 RX ORDER — LISINOPRIL 10 MG/1
TABLET ORAL
Qty: 90 TAB | Refills: 0 | Status: SHIPPED | OUTPATIENT
Start: 2019-12-09 | End: 2020-02-03 | Stop reason: SDUPTHER

## 2019-12-28 RX ORDER — ACYCLOVIR 400 MG/1
400 TABLET ORAL 2 TIMES DAILY
Qty: 180 TAB | Refills: 1 | Status: SHIPPED | OUTPATIENT
Start: 2019-12-28

## 2020-01-21 ENCOUNTER — APPOINTMENT (RX ONLY)
Dept: URBAN - METROPOLITAN AREA CLINIC 20 | Facility: CLINIC | Age: 80
Setting detail: DERMATOLOGY
End: 2020-01-21

## 2020-01-21 DIAGNOSIS — D22 MELANOCYTIC NEVI: ICD-10-CM

## 2020-01-21 DIAGNOSIS — L82.1 OTHER SEBORRHEIC KERATOSIS: ICD-10-CM

## 2020-01-21 DIAGNOSIS — D18.0 HEMANGIOMA: ICD-10-CM

## 2020-01-21 DIAGNOSIS — L81.4 OTHER MELANIN HYPERPIGMENTATION: ICD-10-CM

## 2020-01-21 DIAGNOSIS — L57.0 ACTINIC KERATOSIS: ICD-10-CM

## 2020-01-21 DIAGNOSIS — L57.8 OTHER SKIN CHANGES DUE TO CHRONIC EXPOSURE TO NONIONIZING RADIATION: ICD-10-CM

## 2020-01-21 PROBLEM — D18.01 HEMANGIOMA OF SKIN AND SUBCUTANEOUS TISSUE: Status: ACTIVE | Noted: 2020-01-21

## 2020-01-21 PROBLEM — D22.5 MELANOCYTIC NEVI OF TRUNK: Status: ACTIVE | Noted: 2020-01-21

## 2020-01-21 PROCEDURE — ? PRESCRIPTION

## 2020-01-21 PROCEDURE — 99214 OFFICE O/P EST MOD 30 MIN: CPT | Mod: 25

## 2020-01-21 PROCEDURE — ? COUNSELING

## 2020-01-21 PROCEDURE — 17000 DESTRUCT PREMALG LESION: CPT

## 2020-01-21 PROCEDURE — ? LIQUID NITROGEN

## 2020-01-21 RX ORDER — IMIQUIMOD 12.5 MG/.25G
CREAM TOPICAL
Qty: 1 | Refills: 4 | Status: ERX | COMMUNITY
Start: 2020-01-21

## 2020-01-21 RX ADMIN — IMIQUIMOD: 12.5 CREAM TOPICAL at 00:00

## 2020-01-21 ASSESSMENT — LOCATION DETAILED DESCRIPTION DERM
LOCATION DETAILED: LEFT CLAVICULAR SKIN
LOCATION DETAILED: RIGHT MID-UPPER BACK
LOCATION DETAILED: RIGHT DISTAL DORSAL FOREARM
LOCATION DETAILED: LEFT INFERIOR CENTRAL MALAR CHEEK
LOCATION DETAILED: LEFT ANTERIOR DISTAL THIGH
LOCATION DETAILED: RIGHT ANTERIOR PROXIMAL UPPER ARM
LOCATION DETAILED: LEFT MEDIAL SUPERIOR CHEST
LOCATION DETAILED: RIGHT ANTERIOR DISTAL THIGH
LOCATION DETAILED: RIGHT CENTRAL MALAR CHEEK
LOCATION DETAILED: RIGHT INFERIOR UPPER BACK
LOCATION DETAILED: RIGHT POPLITEAL SKIN
LOCATION DETAILED: RIGHT LATERAL ABDOMEN
LOCATION DETAILED: LEFT ANTERIOR PROXIMAL UPPER ARM
LOCATION DETAILED: LEFT POPLITEAL SKIN
LOCATION DETAILED: LEFT DISTAL DORSAL FOREARM
LOCATION DETAILED: RIGHT SUPERIOR MEDIAL UPPER BACK

## 2020-01-21 ASSESSMENT — LOCATION SIMPLE DESCRIPTION DERM
LOCATION SIMPLE: LEFT UPPER ARM
LOCATION SIMPLE: RIGHT UPPER ARM
LOCATION SIMPLE: RIGHT POPLITEAL SKIN
LOCATION SIMPLE: LEFT CHEEK
LOCATION SIMPLE: RIGHT THIGH
LOCATION SIMPLE: LEFT THIGH
LOCATION SIMPLE: ABDOMEN
LOCATION SIMPLE: RIGHT FOREARM
LOCATION SIMPLE: CHEST
LOCATION SIMPLE: LEFT CLAVICULAR SKIN
LOCATION SIMPLE: LEFT FOREARM
LOCATION SIMPLE: RIGHT UPPER BACK
LOCATION SIMPLE: RIGHT CHEEK
LOCATION SIMPLE: LEFT POPLITEAL SKIN

## 2020-01-21 ASSESSMENT — LOCATION ZONE DERM
LOCATION ZONE: LEG
LOCATION ZONE: FACE
LOCATION ZONE: TRUNK
LOCATION ZONE: ARM

## 2020-01-27 ENCOUNTER — HOSPITAL ENCOUNTER (OUTPATIENT)
Dept: LAB | Facility: MEDICAL CENTER | Age: 80
End: 2020-01-27
Attending: NURSE PRACTITIONER
Payer: MEDICARE

## 2020-01-27 DIAGNOSIS — I10 ESSENTIAL HYPERTENSION: ICD-10-CM

## 2020-01-27 DIAGNOSIS — E78.5 HYPERLIPIDEMIA WITH TARGET LDL LESS THAN 130: ICD-10-CM

## 2020-01-27 LAB
ALBUMIN SERPL BCP-MCNC: 4 G/DL (ref 3.2–4.9)
ALBUMIN/GLOB SERPL: 1.3 G/DL
ALP SERPL-CCNC: 37 U/L (ref 30–99)
ALT SERPL-CCNC: 21 U/L (ref 2–50)
ANION GAP SERPL CALC-SCNC: 9 MMOL/L (ref 0–11.9)
AST SERPL-CCNC: 27 U/L (ref 12–45)
BILIRUB SERPL-MCNC: 0.5 MG/DL (ref 0.1–1.5)
BUN SERPL-MCNC: 23 MG/DL (ref 8–22)
CALCIUM SERPL-MCNC: 10.1 MG/DL (ref 8.5–10.5)
CHLORIDE SERPL-SCNC: 102 MMOL/L (ref 96–112)
CHOLEST SERPL-MCNC: 163 MG/DL (ref 100–199)
CO2 SERPL-SCNC: 27 MMOL/L (ref 20–33)
CREAT SERPL-MCNC: 0.94 MG/DL (ref 0.5–1.4)
CREAT UR-MCNC: 86.6 MG/DL
FASTING STATUS PATIENT QL REPORTED: NORMAL
GLOBULIN SER CALC-MCNC: 3.2 G/DL (ref 1.9–3.5)
GLUCOSE SERPL-MCNC: 98 MG/DL (ref 65–99)
HDLC SERPL-MCNC: 57 MG/DL
LDLC SERPL CALC-MCNC: 87 MG/DL
MICROALBUMIN UR-MCNC: <0.7 MG/DL
MICROALBUMIN/CREAT UR: NORMAL MG/G (ref 0–30)
POTASSIUM SERPL-SCNC: 4.1 MMOL/L (ref 3.6–5.5)
PROT SERPL-MCNC: 7.2 G/DL (ref 6–8.2)
SODIUM SERPL-SCNC: 138 MMOL/L (ref 135–145)
TRIGL SERPL-MCNC: 97 MG/DL (ref 0–149)

## 2020-01-27 PROCEDURE — 80061 LIPID PANEL: CPT

## 2020-01-27 PROCEDURE — 82570 ASSAY OF URINE CREATININE: CPT

## 2020-01-27 PROCEDURE — 82043 UR ALBUMIN QUANTITATIVE: CPT

## 2020-01-27 PROCEDURE — 36415 COLL VENOUS BLD VENIPUNCTURE: CPT

## 2020-01-27 PROCEDURE — 80053 COMPREHEN METABOLIC PANEL: CPT

## 2020-02-03 ENCOUNTER — OFFICE VISIT (OUTPATIENT)
Dept: MEDICAL GROUP | Facility: MEDICAL CENTER | Age: 80
End: 2020-02-03
Payer: MEDICARE

## 2020-02-03 VITALS
HEIGHT: 66 IN | BODY MASS INDEX: 22.02 KG/M2 | WEIGHT: 137 LBS | TEMPERATURE: 98 F | SYSTOLIC BLOOD PRESSURE: 112 MMHG | DIASTOLIC BLOOD PRESSURE: 50 MMHG | OXYGEN SATURATION: 98 % | HEART RATE: 72 BPM

## 2020-02-03 DIAGNOSIS — M85.89 OSTEOPENIA OF MULTIPLE SITES: ICD-10-CM

## 2020-02-03 DIAGNOSIS — B00.9 HSV INFECTION: ICD-10-CM

## 2020-02-03 DIAGNOSIS — E78.5 HYPERLIPIDEMIA WITH TARGET LDL LESS THAN 130: ICD-10-CM

## 2020-02-03 DIAGNOSIS — R01.1 CARDIAC MURMUR: ICD-10-CM

## 2020-02-03 DIAGNOSIS — I10 ESSENTIAL HYPERTENSION: ICD-10-CM

## 2020-02-03 PROCEDURE — G0439 PPPS, SUBSEQ VISIT: HCPCS | Performed by: NURSE PRACTITIONER

## 2020-02-03 RX ORDER — TRIAMTERENE AND HYDROCHLOROTHIAZIDE 37.5; 25 MG/1; MG/1
1 CAPSULE ORAL
Qty: 90 CAP | Refills: 0 | Status: SHIPPED | OUTPATIENT
Start: 2020-02-03 | End: 2020-04-24

## 2020-02-03 RX ORDER — ATORVASTATIN CALCIUM 20 MG/1
20 TABLET, FILM COATED ORAL
Qty: 90 TAB | Refills: 0 | Status: SHIPPED | OUTPATIENT
Start: 2020-02-03 | End: 2020-03-08 | Stop reason: SDUPTHER

## 2020-02-03 RX ORDER — LISINOPRIL 10 MG/1
10 TABLET ORAL
Qty: 90 TAB | Refills: 0 | Status: SHIPPED | OUTPATIENT
Start: 2020-02-03 | End: 2020-04-24

## 2020-02-03 ASSESSMENT — PATIENT HEALTH QUESTIONNAIRE - PHQ9: CLINICAL INTERPRETATION OF PHQ2 SCORE: 0

## 2020-02-03 ASSESSMENT — ACTIVITIES OF DAILY LIVING (ADL): BATHING_REQUIRES_ASSISTANCE: 0

## 2020-02-03 ASSESSMENT — ENCOUNTER SYMPTOMS: GENERAL WELL-BEING: EXCELLENT

## 2020-02-03 NOTE — PROGRESS NOTES
Subjective:      Cinthia Arreola is a 79 y.o. female who presents with HRA          HPI  Seen in f/u for HRA.  She is feeling well.   Reviewed lab with pt.  Her GFR is up from 56 to 57.  Doesn't drink enough water.  Alb/cr ratio, LP, CMP is wnl.    She is getting a face lift in near future.      Osteopenia of multiple sites  She is getting reclast in sept.  Her last dexa last year showed improved spine but mildly dec hip.  No hx of fx.  She is on ca++ and d.  She does weight bearing exercise.     Cardiac murmur  Has had in past but now resolved    Hypertension, essential  Stable and controlled on meds    Hyperlipidemia with target LDL less than 130  Stable and controlled on meds    HSV infection  Stable on acyclovir        Patient Active Problem List    Diagnosis Date Noted   • HSV infection 01/17/2019   • Preventative health care 06/05/2013   • Osteopenia of multiple sites    • Cardiac murmur    • Hypertension, essential    • Hyperlipidemia with target LDL less than 130    • Compression fracture of lumbar spine, non-traumatic (HCC) 11/26/2012     Current Outpatient Medications   Medication Sig Dispense Refill   • acyclovir (ZOVIRAX) 400 MG tablet Take 1 Tab by mouth 2 times a day. 180 Tab 1   • triamterene/hctz (MAXZIDE-25/DYAZIDE) 37.5-25 MG Cap TAKE 1 CAPSULE BY MOUTH  EVERY DAY 90 Cap 0   • lisinopril (PRINIVIL) 10 MG Tab TAKE 1 TABLET BY MOUTH  EVERY DAY 90 Tab 0   • atorvastatin (LIPITOR) 20 MG Tab Take 1 Tab by mouth every day. 90 Tab 0   • tretinoin (RETIN-A) 0.05 % cream Apply 20 g to affected area(s) every day. Apply to face 60 g 3   • Cholecalciferol (VITAMIN D) 2000 units Cap Take 1 Cap by mouth every day. 90 Cap 3   • aspirin EC (ECOTRIN) 81 MG TBEC Take 81 mg by mouth every day.     • folic acid (FOLVITE) 1 MG TABS Take 1 mg by mouth every day.     • magnesium chloride SR (SLO-MAG) 535 (64 MG) MG TBCR Take 535 mg by mouth every day.     • coenzyme Q-10 30 MG capsule Take 60 mg by mouth every day.     •  Selenium (SELENIMIN PO) Take  by mouth.     • Ginkgo Biloba 40 MG TABS Take  by mouth.     • MULTIPLE VITAMIN PO Take  by mouth.     • Glucosamine-Chondroit-Vit C-Mn (GLUCOSAMINE CHONDR 1500 COMPLX PO) Take  by mouth.     • Flaxseed, Linseed, (FLAX SEED OIL) 1000 MG CAPS Take  by mouth.     • ascorbic acid (ASCORBIC ACID) 500 MG TABS Take 500 mg by mouth every day.     • Probiotic Product (PROBIOTIC DAILY PO) Take 1 Tab by mouth every day.     • Biotin 1000 MCG TABS Take  by mouth.     • Calcium Citrate-Vitamin D (CITRACAL + D PO) Take  by mouth.     • cyanocobalamin (VITAMIN B-12) 100 MCG TABS Take 1,000 mcg by mouth every day.     • pyridoxine (VITAMIN B-6) 50 MG TABS Take 100 mg by mouth every day.     • docosahexanoic acid (OMEGA 3 FA) 1000 MG CAPS Take 1,000 mg by mouth 2 times a day, with meals.       No current facility-administered medications for this visit.      Patient has no known allergies.        ROS  Review of Systems   Constitutional: Negative.  Negative for fever, chills, weight loss, malaise/fatigue and diaphoresis.   HENT: Negative.  Negative for hearing loss, ear pain, nosebleeds, congestion, sore throat, neck pain, tinnitus and ear discharge.    Eyes: Negative.  Negative for blurred vision, double vision, photophobia, pain, discharge and redness.   Respiratory: Negative.  Negative for cough, hemoptysis, sputum production, shortness of breath, wheezing and stridor.    Cardiovascular: Negative.  Negative for chest pain, palpitations, orthopnea, claudication, leg swelling and PND.   Gastrointestinal: Negative.  Negative for heartburn, nausea, vomiting, abdominal pain, diarrhea, constipation, blood in stool and melena.   Genitourinary: Negative.  Negative for dysuria, urgency, frequency, incontinence, hematuria and flank pain.   Musculoskeletal: Negative.  Negative for myalgias, back pain, joint pain and falls.   Skin: Negative.  Negative for itching and rash.   Neurological: Negative.  Negative for  dizziness, tingling, tremors, sensory change, speech change, focal weakness, seizures, loss of consciousness, weakness and headaches.   Endo/Heme/Allergies: Negative.  Negative for environmental allergies and polydipsia. Does not bruise/bleed easily.   Psychiatric/Behavioral: Negative.  Negative for depression, suicidal ideas, hallucinations, memory loss and substance abuse. The patient is not nervous/anxious and does not have insomnia.    All other systems reviewed and are negative.      Current supplements as per medication list.       Allergies: Patient has no known allergies.    Current social contact/activities:   1.  Play bridge  2. [employer]  3.  [employer]  4.  Work out at gym  5. m ovies   6.  travel    She  reports that she quit smoking about 40 years ago. She has never used smokeless tobacco. She reports current alcohol use of about 2.0 oz of alcohol per week. She reports that she does not use drugs.  Counseling given: Not Answered      DPA/Advanced Directive:  Patient has Advanced Directive, Living Will and Durable Power of , but it is not on file. Instructed to bring in a copy to scan into their chart.    ROS:    Gait: Uses no assistive device  Ostomy: No  Other tubes: No  Amputations: No  Chronic oxygen use: No  Last eye exam: 1 yr ago.  Followed by ophth every 2yrs  Wears hearing aids: No   : Reports urinary leakage during the last 6 months that has somewhat interfered with their daily activities or sleep.    Screening:  none  Depression Screening    Little interest or pleasure in doing things?  0 - not at all  Feeling down, depressed , or hopeless? 0 - not at all  Patient Health Questionnaire Score: 0     If depressive symptoms identified deferred to follow up visit unless specifically addressed in assessment and plan.    Interpretation of PHQ-9 Total Score   Score Severity   1-4 No Depression   5-9 Mild Depression   10-14 Moderate Depression   15-19 Moderately Severe Depression   20-27 Severe  Depression    Screening for Cognitive Impairment    Three Minute Recall (village, kitchen, baby) 3/3    Luis clock face with all 12 numbers and set the hands to show 10 past 10.  Yes    Cognitive concerns identified deferred for follow up unless specifically addressed in assessment and plan.    Fall Risk Assessment    Has the patient had two or more falls in the last year or any fall with injury in the last year?  No    Safety Assessment    Throw rugs on floor.  No  Handrails on all stairs.  Yes  Good lighting in all hallways.  Yes  Difficulty hearing.  No  Patient counseled about all safety risks that were identified.    Functional Assessment ADLs    Are there any barriers preventing you from cooking for yourself or meeting nutritional needs?  No.    Are there any barriers preventing you from driving safely or obtaining transportation?  No.    Are there any barriers preventing you from using a telephone or calling for help?  No.    Are there any barriers preventing you from shopping?  No.    Are there any barriers preventing you from taking care of your own finances?  No.    Are there any barriers preventing you from managing your medications?  No.    Are there any barriers preventing you from showering, bathing or dressing yourself?  No.    Are you currently engaging in any exercise or physical activity?  Yes.     What is your perception of your health?  Excellent.      Health Maintenance Summary                Annual Wellness Visit Overdue 1/18/2020      Done 1/17/2019 SUBSEQUENT ANNUAL WELLNESS VISIT-INCLUDES PPPS ()     Patient has more history with this topic...    MAMMOGRAM Next Due 8/22/2020      Done 8/22/2019 MA-SCREENING MAMMO BILAT W/TOMOSYNTHESIS W/CAD     Patient has more history with this topic...    BONE DENSITY Next Due 8/22/2021      Done 8/22/2019 DS-BONE DENSITY STUDY (DEXA)     Patient has more history with this topic...    IMM DTaP/Tdap/Td Vaccine Next Due 11/21/2023      Done 11/21/2013  "Imm Admin: Tdap Vaccine    COLONOSCOPY Next Due 2024      Done 2014 AMB REFERRAL TO GI FOR COLONOSCOPY          Patient Care Team:  SAGAR Saeed as PCP - General (Family Medicine)        Social History     Tobacco Use   • Smoking status: Former Smoker     Last attempt to quit: 1980     Years since quittin.1   • Smokeless tobacco: Never Used   Substance Use Topics   • Alcohol use: Yes     Alcohol/week: 2.0 oz     Types: 4 Glasses of wine per week   • Drug use: No     Family History   Problem Relation Age of Onset   • Stroke Mother    • Hypertension Mother    • Hyperlipidemia Mother    • Diabetes Mother    • Stroke Father    • Hypertension Father    • Diabetes Father    • Diabetes Brother    • Cancer Maternal Grandfather    • Lung Disease Paternal Grandfather      She  has a past medical history of Cardiac murmur, Compression fracture of lumbar spine, non-traumatic (HCC) (12), HSV infection (2019), Hyperlipidemia LDL goal < 130, Hypertension, and Osteopenia. She also has no past medical history of Breast cancer (HCC).   Past Surgical History:   Procedure Laterality Date   • CATARACT EXTRACTION WITH IOL      celso eyes   • NASAL ENDOSCOPY      removal of clot after fall   • KNEE ARTHROSCOPY      meniscus repair celso knees   • RHINOPLASTY     • TONSILLECTOMY AND ADENOIDECTOMY  age 3   • TUBAL COAGULATION LAPAROSCOPIC BILATERAL         Exam:   /50 (BP Location: Right arm, Patient Position: Sitting)   Pulse 72   Temp 36.7 °C (98 °F) (Temporal)   Ht 1.664 m (5' 5.5\")   Wt 62.1 kg (137 lb)   SpO2 98%  Body mass index is 22.45 kg/m².    Hearing fair.    Dentition good  Alert, oriented in no acute distress.  Eye contact is good, speech goal directed, affect calm    Services suggested: No services needed at this time  Health Care Screening: Age-appropriate preventive services recommended by USPTF and ACIP covered by Medicare were discussed today. Services ordered if indicated " "and agreed upon by the patient.  Referrals offered: Community-based lifestyle interventions to reduce health risks and promote self-management and wellness, fall prevention, nutrition, physical activity, tobacco-use cessation, weight loss, and mental health services as per orders if indicated.    Discussion today about general wellness and lifestyle habits:    · Prevent falls and reduce trip hazards; Cautioned about securing or removing rugs.  · Have a working fire alarm and carbon monoxide detector;   · Engage in regular physical activity and social activities        Objective:     /50 (BP Location: Right arm, Patient Position: Sitting)   Pulse 72   Temp 36.7 °C (98 °F) (Temporal)   Ht 1.664 m (5' 5.5\")   Wt 62.1 kg (137 lb)   SpO2 98%   BMI 22.45 kg/m²      Physical Exam      Physical Exam   Vitals reviewed.  Constitutional: oriented to person, place, and time. appears well-developed and well-nourished. No distress.   HENT: Head: Normocephalic and atraumatic. Bilateral tympanic membranes wnl w/o bulging.  Right Ear: External ear normal. Left Ear: External ear normal. Nose: Nose normal.  Mouth/Throat: Oropharynx is clear and moist. No oropharyngeal exudate. celso tm wnl. Eyes: Conjunctivae and EOM are normal. Pupils are equal, round, and reactive to light. Right eye exhibits no discharge. Left eye exhibits no discharge. No scleral icterus.    Neck: Normal range of motion. Neck supple. No JVD present.   Cardiovascular: Normal rate, regular rhythm, normal heart sounds and intact distal pulses.  Exam reveals no gallop and no friction rub.  No murmur heard.  No carotid bruits   Pulmonary/Chest: Effort normal and breath sounds normal. No stridor. No respiratory distress. no wheezes or rales. exhibits no tenderness.   Abdominal: Soft. Bowel sounds are normal. exhibits no distension and no mass. No tenderness. no rebound and no guarding.   Musculoskeletal: Normal range of motion. exhibits no edema or tenderness. "  celso pedal pulses 2+.  Lymphadenopathy:  no cervical or supraclavicular adenopathy.   Neurological: alert and oriented to person, place, and time. has normal reflexes. displays normal reflexes. No cranial nerve deficit. exhibits normal muscle tone. Coordination normal.   Skin: Skin is warm and dry. No rash noted. no diaphoresis. No erythema. No pallor.   Psychiatric: normal mood and affect. behavior is normal.          Assessment/Plan:     1. Essential hypertension  Subsequent Annual Wellness Visit - Includes PPPS ()    triamterene/hctz (MAXZIDE-25/DYAZIDE) 37.5-25 MG Cap    lisinopril (PRINIVIL) 10 MG Tab    stable on meds.  refilled all meds  alb/cr ratio is wnl.  f/u yearly.  call for lab slip   2. Hyperlipidemia with target LDL less than 130  Subsequent Annual Wellness Visit - Includes PPPS ()    triamterene/hctz (MAXZIDE-25/DYAZIDE) 37.5-25 MG Cap    lisinopril (PRINIVIL) 10 MG Tab    stable on lipitor.  LP is wnl   3. Osteopenia of multiple sites  Subsequent Annual Wellness Visit - Includes PPPS ()    last dexa scan showed improved spine but dec hip.  continue reclast yearly.  take 1000 mg ca++ and d3 2000 units dialy.  cont wt bearing exercise   4. Cardiac murmur  Subsequent Annual Wellness Visit - Includes PPPS ()    resolved   5. HSV infection  Subsequent Annual Wellness Visit - Includes PPPS ()    stable on meds     6.  F/u yearly.  Call for lab slip

## 2020-02-03 NOTE — ASSESSMENT & PLAN NOTE
She is getting reclast in sept.  Her last dexa last year showed improved spine but mildly dec hip.  No hx of fx.  She is on ca++ and d.  She does weight bearing exercise.

## 2020-03-08 RX ORDER — ATORVASTATIN CALCIUM 20 MG/1
20 TABLET, FILM COATED ORAL
Qty: 90 TAB | Refills: 0 | Status: SHIPPED | OUTPATIENT
Start: 2020-03-08 | End: 2020-06-01

## 2020-06-08 ENCOUNTER — APPOINTMENT (RX ONLY)
Dept: URBAN - METROPOLITAN AREA CLINIC 20 | Facility: CLINIC | Age: 80
Setting detail: DERMATOLOGY
End: 2020-06-08

## 2020-06-08 DIAGNOSIS — L82.1 OTHER SEBORRHEIC KERATOSIS: ICD-10-CM

## 2020-06-08 DIAGNOSIS — L71.8 OTHER ROSACEA: ICD-10-CM | Status: IMPROVED

## 2020-06-08 DIAGNOSIS — L57.0 ACTINIC KERATOSIS: ICD-10-CM | Status: INADEQUATELY CONTROLLED

## 2020-06-08 PROCEDURE — ? COUNSELING

## 2020-06-08 PROCEDURE — 99214 OFFICE O/P EST MOD 30 MIN: CPT

## 2020-06-08 PROCEDURE — ? ADDITIONAL NOTES

## 2020-06-08 PROCEDURE — ? PRESCRIPTION

## 2020-06-08 RX ORDER — IMIQUIMOD 12.5 MG/.25G
CREAM TOPICAL
Qty: 1 | Refills: 1 | Status: ERX | COMMUNITY
Start: 2020-06-08

## 2020-06-08 RX ADMIN — IMIQUIMOD THIN LAYER: 12.5 CREAM TOPICAL at 00:00

## 2020-06-08 ASSESSMENT — LOCATION DETAILED DESCRIPTION DERM
LOCATION DETAILED: LEFT CENTRAL MALAR CHEEK
LOCATION DETAILED: RIGHT MEDIAL FOREHEAD
LOCATION DETAILED: RIGHT INFERIOR CENTRAL MALAR CHEEK
LOCATION DETAILED: LEFT INFERIOR CENTRAL MALAR CHEEK

## 2020-06-08 ASSESSMENT — LOCATION SIMPLE DESCRIPTION DERM
LOCATION SIMPLE: RIGHT FOREHEAD
LOCATION SIMPLE: RIGHT CHEEK
LOCATION SIMPLE: LEFT CHEEK

## 2020-06-08 ASSESSMENT — LOCATION ZONE DERM: LOCATION ZONE: FACE

## 2020-09-10 ENCOUNTER — APPOINTMENT (RX ONLY)
Dept: URBAN - METROPOLITAN AREA CLINIC 20 | Facility: CLINIC | Age: 80
Setting detail: DERMATOLOGY
End: 2020-09-10

## 2020-09-10 ENCOUNTER — TELEPHONE (OUTPATIENT)
Dept: MEDICAL GROUP | Facility: MEDICAL CENTER | Age: 80
End: 2020-09-10

## 2020-09-10 DIAGNOSIS — L82.1 OTHER SEBORRHEIC KERATOSIS: ICD-10-CM | Status: STABLE

## 2020-09-10 DIAGNOSIS — M81.0 POST-MENOPAUSAL OSTEOPOROSIS: ICD-10-CM

## 2020-09-10 DIAGNOSIS — L57.0 ACTINIC KERATOSIS: ICD-10-CM | Status: RESOLVED

## 2020-09-10 DIAGNOSIS — L71.8 OTHER ROSACEA: ICD-10-CM

## 2020-09-10 DIAGNOSIS — Z12.31 ENCOUNTER FOR SCREENING MAMMOGRAM FOR MALIGNANT NEOPLASM OF BREAST: ICD-10-CM

## 2020-09-10 PROCEDURE — ? COUNSELING

## 2020-09-10 PROCEDURE — 99213 OFFICE O/P EST LOW 20 MIN: CPT

## 2020-09-10 PROCEDURE — ? ADDITIONAL NOTES

## 2020-09-10 PROCEDURE — ? DIAGNOSIS COMMENT

## 2020-09-10 ASSESSMENT — LOCATION DETAILED DESCRIPTION DERM
LOCATION DETAILED: INFERIOR THORACIC SPINE
LOCATION DETAILED: LEFT INFERIOR CENTRAL MALAR CHEEK

## 2020-09-10 ASSESSMENT — LOCATION SIMPLE DESCRIPTION DERM
LOCATION SIMPLE: UPPER BACK
LOCATION SIMPLE: LEFT CHEEK

## 2020-09-10 ASSESSMENT — LOCATION ZONE DERM
LOCATION ZONE: FACE
LOCATION ZONE: TRUNK

## 2020-09-10 NOTE — TELEPHONE ENCOUNTER
Pt needs reclast order sent to infusion center.  Please remind me on monday or send this back to me on monday

## 2020-09-12 RX ORDER — ZOLEDRONIC ACID 5 MG/100ML
5 INJECTION, SOLUTION INTRAVENOUS ONCE
Qty: 100 ML | Refills: 0 | Status: SHIPPED | OUTPATIENT
Start: 2020-09-12 | End: 2020-09-12

## 2020-09-14 ENCOUNTER — HOSPITAL ENCOUNTER (OUTPATIENT)
Dept: RADIOLOGY | Facility: MEDICAL CENTER | Age: 80
End: 2020-09-14
Attending: NURSE PRACTITIONER
Payer: MEDICARE

## 2020-09-14 DIAGNOSIS — Z12.31 VISIT FOR SCREENING MAMMOGRAM: ICD-10-CM

## 2020-09-14 PROCEDURE — 77067 SCR MAMMO BI INCL CAD: CPT

## 2020-10-07 ENCOUNTER — RX ONLY (OUTPATIENT)
Age: 80
Setting detail: RX ONLY
End: 2020-10-07

## 2020-10-07 ENCOUNTER — OUTPATIENT INFUSION SERVICES (OUTPATIENT)
Dept: ONCOLOGY | Facility: MEDICAL CENTER | Age: 80
End: 2020-10-07
Attending: NURSE PRACTITIONER
Payer: MEDICARE

## 2020-10-07 VITALS
OXYGEN SATURATION: 100 % | HEIGHT: 65 IN | BODY MASS INDEX: 21.82 KG/M2 | HEART RATE: 56 BPM | RESPIRATION RATE: 16 BRPM | DIASTOLIC BLOOD PRESSURE: 47 MMHG | TEMPERATURE: 98.1 F | WEIGHT: 130.95 LBS | SYSTOLIC BLOOD PRESSURE: 110 MMHG

## 2020-10-07 DIAGNOSIS — M85.89 OSTEOPENIA OF MULTIPLE SITES: ICD-10-CM

## 2020-10-07 PROCEDURE — 306780 HCHG STAT FOR TRANSFUSION PER CASE

## 2020-10-07 RX ORDER — DOXYCYCLINE HYCLATE 20 MG/1
TABLET, FILM COATED ORAL
Qty: 60 | Refills: 7 | Status: ERX

## 2020-10-07 RX ORDER — DOXYCYCLINE HYCLATE 20 MG
20 TABLET ORAL
COMMUNITY
Start: 2020-09-14

## 2020-10-07 NOTE — PROGRESS NOTES
Pt ambulatory to [facility] for Reclast for osteoporosis.  Pt denies any s/s of infection, pt reports she is having more dental implant screws placed on Monday 10-12.  Pt educated that this RN will contact her oral surgeon for guidance due to incidence of jaw bone necrosis w/ this drug, pt states her understanding.  Call placed to Dr Bruce MI, pt's oral surgeon, who instructed that pt must have a minimum 6 weeks between his procedure and pt receiving Reclast--TORB.  Pt educated on POC, pt states she will be moving to [place] before then.  Pt educated to f/u w/ her new PCP in [place] for Reclast.  Pt also educated on necessity of informing any dental professionals of her receiving this drug yearly, pt states her understanding.  Pt left on foot in [facility].  No f/u appt needed at this time.

## 2020-10-07 NOTE — LETTER
Infusion Services   [address] [place], [place] [address], [place] [address]  Phone: [phone]  Fax: [phone]              Dear Dr Ivy,    Your patient, Cinthia Arreola (: 1940), was scheduled at [facility].  Cinthia's encounter diagnosis is:  No diagnosis found.  She arrived for her appointment, and  the scheduled treatment was held-due to pt going to have dental implant surgery on 10-12.  Per her oral surgeon Dr Bruce MI, pt must have minimum 6 weeks between his surgery and receiving Reclast.  Pt is moving to [place] prior to that time frame, so she will f/u w/ her new PCP..         Her next appointment is did not reschedule; because she must see her new provider first.    For more information, you may review the nurse's progress notes in chart review under the notes section.       Sincerely,  Angeles Cotter R.N.

## 2020-10-14 ENCOUNTER — RX ONLY (OUTPATIENT)
Age: 80
Setting detail: RX ONLY
End: 2020-10-14

## 2020-10-14 RX ORDER — DOXYCYCLINE HYCLATE 20 MG/1
TABLET, FILM COATED ORAL
Qty: 60 | Refills: 11 | Status: ERX

## 2021-01-11 DIAGNOSIS — Z23 NEED FOR VACCINATION: ICD-10-CM

## 2021-02-01 ENCOUNTER — APPOINTMENT (RX ONLY)
Dept: URBAN - NONMETROPOLITAN AREA CLINIC 13 | Facility: CLINIC | Age: 81
Setting detail: DERMATOLOGY
End: 2021-02-01

## 2021-02-01 VITALS — WEIGHT: 130 LBS | HEIGHT: 63 IN

## 2021-02-01 DIAGNOSIS — L57.8 OTHER SKIN CHANGES DUE TO CHRONIC EXPOSURE TO NONIONIZING RADIATION: ICD-10-CM

## 2021-02-01 DIAGNOSIS — L73.8 OTHER SPECIFIED FOLLICULAR DISORDERS: ICD-10-CM

## 2021-02-01 PROCEDURE — 99202 OFFICE O/P NEW SF 15 MIN: CPT

## 2021-02-01 PROCEDURE — ? COUNSELING

## 2021-02-01 PROCEDURE — ? SUNSCREEN RECOMMENDATIONS

## 2021-02-01 PROCEDURE — ? ADDITIONAL NOTES

## 2021-02-01 ASSESSMENT — LOCATION ZONE DERM
LOCATION ZONE: NOSE
LOCATION ZONE: FACE

## 2021-02-01 ASSESSMENT — LOCATION SIMPLE DESCRIPTION DERM
LOCATION SIMPLE: LEFT CHEEK
LOCATION SIMPLE: LEFT NOSE

## 2021-02-01 ASSESSMENT — LOCATION DETAILED DESCRIPTION DERM
LOCATION DETAILED: LEFT CENTRAL MALAR CHEEK
LOCATION DETAILED: LEFT NASAL SIDEWALL

## 2021-02-01 NOTE — PROCEDURE: COUNSELING
Detail Level: Detailed
Patient Specific Counseling (Will Not Stick From Patient To Patient): Patient instructed to RTC should she note any change in appearance, bleeding, or symptoms

## 2021-02-01 NOTE — PROCEDURE: ADDITIONAL NOTES
Additional Notes: -pt noted in HPI\\n- Patient instructed to monitor for changes
Render Risk Assessment In Note?: no
Detail Level: Simple
Additional Notes: I recommended that the patient schedule a full body skin exam.

## 2021-05-26 ENCOUNTER — APPOINTMENT (RX ONLY)
Dept: URBAN - NONMETROPOLITAN AREA CLINIC 13 | Facility: CLINIC | Age: 81
Setting detail: DERMATOLOGY
End: 2021-05-26

## 2021-05-26 DIAGNOSIS — L82.1 OTHER SEBORRHEIC KERATOSIS: ICD-10-CM

## 2021-05-26 DIAGNOSIS — L57.8 OTHER SKIN CHANGES DUE TO CHRONIC EXPOSURE TO NONIONIZING RADIATION: ICD-10-CM

## 2021-05-26 DIAGNOSIS — D18.0 HEMANGIOMA: ICD-10-CM

## 2021-05-26 DIAGNOSIS — D22 MELANOCYTIC NEVI: ICD-10-CM

## 2021-05-26 DIAGNOSIS — L57.0 ACTINIC KERATOSIS: ICD-10-CM

## 2021-05-26 PROBLEM — D18.01 HEMANGIOMA OF SKIN AND SUBCUTANEOUS TISSUE: Status: ACTIVE | Noted: 2021-05-26

## 2021-05-26 PROBLEM — D22.5 MELANOCYTIC NEVI OF TRUNK: Status: ACTIVE | Noted: 2021-05-26

## 2021-05-26 PROCEDURE — 17000 DESTRUCT PREMALG LESION: CPT

## 2021-05-26 PROCEDURE — ? SUNSCREEN RECOMMENDATIONS

## 2021-05-26 PROCEDURE — 99213 OFFICE O/P EST LOW 20 MIN: CPT | Mod: 25

## 2021-05-26 PROCEDURE — ? COUNSELING

## 2021-05-26 PROCEDURE — ? LIQUID NITROGEN

## 2021-05-26 PROCEDURE — 17003 DESTRUCT PREMALG LES 2-14: CPT

## 2021-05-26 ASSESSMENT — LOCATION DETAILED DESCRIPTION DERM
LOCATION DETAILED: RIGHT DISTAL RADIAL DORSAL FOREARM
LOCATION DETAILED: LEFT MEDIAL ZYGOMA
LOCATION DETAILED: LEFT ANTERIOR SHOULDER
LOCATION DETAILED: LEFT RIB CAGE
LOCATION DETAILED: LEFT INFERIOR CENTRAL MALAR CHEEK
LOCATION DETAILED: LEFT SUPERIOR LATERAL BUCCAL CHEEK
LOCATION DETAILED: LEFT MID-UPPER BACK
LOCATION DETAILED: RIGHT MID-UPPER BACK
LOCATION DETAILED: RIGHT MEDIAL ZYGOMA

## 2021-05-26 ASSESSMENT — LOCATION ZONE DERM
LOCATION ZONE: TRUNK
LOCATION ZONE: ARM
LOCATION ZONE: FACE

## 2021-05-26 ASSESSMENT — LOCATION SIMPLE DESCRIPTION DERM
LOCATION SIMPLE: LEFT SHOULDER
LOCATION SIMPLE: LEFT CHEEK
LOCATION SIMPLE: LEFT UPPER BACK
LOCATION SIMPLE: LEFT ZYGOMA
LOCATION SIMPLE: ABDOMEN
LOCATION SIMPLE: RIGHT FOREARM
LOCATION SIMPLE: RIGHT UPPER BACK
LOCATION SIMPLE: RIGHT ZYGOMA

## 2021-09-23 DIAGNOSIS — I10 ESSENTIAL HYPERTENSION: ICD-10-CM

## 2021-09-23 DIAGNOSIS — E78.5 HYPERLIPIDEMIA WITH TARGET LDL LESS THAN 130: ICD-10-CM

## 2021-09-23 RX ORDER — LISINOPRIL 10 MG/1
TABLET ORAL
Qty: 90 TABLET | Refills: 3 | OUTPATIENT
Start: 2021-09-23

## 2021-09-23 RX ORDER — TRIAMTERENE AND HYDROCHLOROTHIAZIDE 37.5; 25 MG/1; MG/1
CAPSULE ORAL
Qty: 90 CAPSULE | Refills: 3 | OUTPATIENT
Start: 2021-09-23

## 2021-09-23 RX ORDER — ACYCLOVIR 400 MG/1
TABLET ORAL
Qty: 180 TABLET | Refills: 1 | OUTPATIENT
Start: 2021-09-23

## 2021-12-01 ENCOUNTER — APPOINTMENT (RX ONLY)
Dept: URBAN - NONMETROPOLITAN AREA CLINIC 13 | Facility: CLINIC | Age: 81
Setting detail: DERMATOLOGY
End: 2021-12-01

## 2021-12-01 DIAGNOSIS — D18.0 HEMANGIOMA: ICD-10-CM

## 2021-12-01 DIAGNOSIS — L57.8 OTHER SKIN CHANGES DUE TO CHRONIC EXPOSURE TO NONIONIZING RADIATION: ICD-10-CM

## 2021-12-01 DIAGNOSIS — L57.0 ACTINIC KERATOSIS: ICD-10-CM

## 2021-12-01 DIAGNOSIS — L82.1 OTHER SEBORRHEIC KERATOSIS: ICD-10-CM

## 2021-12-01 DIAGNOSIS — D22 MELANOCYTIC NEVI: ICD-10-CM

## 2021-12-01 PROBLEM — D18.01 HEMANGIOMA OF SKIN AND SUBCUTANEOUS TISSUE: Status: ACTIVE | Noted: 2021-12-01

## 2021-12-01 PROBLEM — D22.5 MELANOCYTIC NEVI OF TRUNK: Status: ACTIVE | Noted: 2021-12-01

## 2021-12-01 PROCEDURE — ? COUNSELING

## 2021-12-01 PROCEDURE — ? SUNSCREEN RECOMMENDATIONS

## 2021-12-01 PROCEDURE — ? ORDER FOR PHOTODYNAMIC THERAPY

## 2021-12-01 PROCEDURE — 99213 OFFICE O/P EST LOW 20 MIN: CPT

## 2021-12-01 ASSESSMENT — LOCATION SIMPLE DESCRIPTION DERM
LOCATION SIMPLE: ABDOMEN
LOCATION SIMPLE: LEFT CHEEK
LOCATION SIMPLE: LEFT UPPER BACK
LOCATION SIMPLE: RIGHT UPPER BACK

## 2021-12-01 ASSESSMENT — LOCATION DETAILED DESCRIPTION DERM
LOCATION DETAILED: LEFT MID-UPPER BACK
LOCATION DETAILED: LEFT SUPERIOR LATERAL BUCCAL CHEEK
LOCATION DETAILED: RIGHT MID-UPPER BACK
LOCATION DETAILED: LEFT RIB CAGE

## 2021-12-01 ASSESSMENT — LOCATION ZONE DERM
LOCATION ZONE: FACE
LOCATION ZONE: TRUNK

## 2021-12-01 NOTE — PROCEDURE: ORDER FOR PHOTODYNAMIC THERAPY
Face, Ears And  Scalp Incubation Time: 1 Hour
Frequency Of Pdt: Single Treatment
Back Incubation Time: 2 Hours
Pdt Type: SONALI-U
Consent: The procedure and risks were reviewed with the patient including but not limited to: burning, pigmentary changes, pain, blistering, scabbing, redness, and the possibility of needing numerous treatments. Strict photoprotection after the procedure was also discussed.
Face And Scalp Incubation Time: 1 Hour for the face and 2 Hours for the scalp
Photosensitizer: Ameluz
Detail Level: Simple
Occlusion: No
Location: Face

## 2021-12-01 NOTE — PROCEDURE: MIPS QUALITY
Quality 226: Preventive Care And Screening: Tobacco Use: Screening And Cessation Intervention: Patient screened for tobacco use and is an ex/non-smoker
Quality 111:Pneumonia Vaccination Status For Older Adults: Pneumococcal Vaccination Previously Received
Quality 431: Preventive Care And Screening: Unhealthy Alcohol Use - Screening: Patient not identified as an unhealthy alcohol user when screened for unhealthy alcohol use using a systematic screening method
Quality 110: Preventive Care And Screening: Influenza Immunization: Influenza Immunization Administered during Influenza season
Detail Level: Detailed

## 2022-01-12 ENCOUNTER — APPOINTMENT (RX ONLY)
Dept: URBAN - NONMETROPOLITAN AREA CLINIC 13 | Facility: CLINIC | Age: 82
Setting detail: DERMATOLOGY
End: 2022-01-12

## 2022-01-12 DIAGNOSIS — L57.0 ACTINIC KERATOSIS: ICD-10-CM

## 2022-01-12 PROCEDURE — 96567 PDT DSTR PRMLG LES SKN: CPT

## 2022-01-12 PROCEDURE — ? PDT: BLUE

## 2022-01-12 ASSESSMENT — LOCATION DETAILED DESCRIPTION DERM
LOCATION DETAILED: RIGHT INFERIOR CENTRAL MALAR CHEEK
LOCATION DETAILED: LEFT INFERIOR CENTRAL MALAR CHEEK

## 2022-01-12 ASSESSMENT — LOCATION SIMPLE DESCRIPTION DERM
LOCATION SIMPLE: LEFT CHEEK
LOCATION SIMPLE: RIGHT CHEEK

## 2022-01-12 ASSESSMENT — LOCATION ZONE DERM: LOCATION ZONE: FACE

## 2022-01-12 NOTE — PROCEDURE: PDT: BLUE
Show Anesthesia In Plan?: Yes
Which Photosensitizer Was Used: Ameluz
Post-Care Instructions: I reviewed with the patient in detail post-care instructions. Patient is to avoid sunlight for the next 2 days, and wear sun protection. Patients may expect sunburn like redness, discomfort and scabbing.
Medical Necessity: Precancerous Lesions
Incubation End Time: 10:50 AM
Lot # (Optional): [id number]
Who Performed The Pdt (Staff): Luz
Illumination Time: 00:16:40
Expiration Date (Optional): 09/2022
Light Source: Arjun-U
Was Levulan/Ameluz Applied On A Previous Day?: No
Incubation Time: 1:00:00
Who Performed The Pdt?: Performed by Nurse, MA or Aesthetician ([id number])
Pre-Procedure Text: The treatment areas were cleaned and prepped in the usual fashion.
Ndc# (Optional): 70621-101-10
Treatment Number: 0
Incubation Start Time: 9:50 AM
Anesthesia Type: 1% lidocaine with epinephrine
Detail Level: Zone
Consent: Written consent obtained.  The risks were reviewed with the patient including but not limited to: pigmentary changes, pain, blistering, scabbing, redness, and the remote possibility of scarring.
Number Of Kerasticks/Tubes Billed For: 1

## 2022-06-08 ENCOUNTER — APPOINTMENT (RX ONLY)
Dept: URBAN - NONMETROPOLITAN AREA CLINIC 13 | Facility: CLINIC | Age: 82
Setting detail: DERMATOLOGY
End: 2022-06-08

## 2022-06-08 DIAGNOSIS — L57.0 ACTINIC KERATOSIS: ICD-10-CM

## 2022-06-08 DIAGNOSIS — D18.0 HEMANGIOMA: ICD-10-CM

## 2022-06-08 DIAGNOSIS — D22 MELANOCYTIC NEVI: ICD-10-CM

## 2022-06-08 DIAGNOSIS — L82.1 OTHER SEBORRHEIC KERATOSIS: ICD-10-CM

## 2022-06-08 DIAGNOSIS — L57.8 OTHER SKIN CHANGES DUE TO CHRONIC EXPOSURE TO NONIONIZING RADIATION: ICD-10-CM

## 2022-06-08 PROBLEM — D22.5 MELANOCYTIC NEVI OF TRUNK: Status: ACTIVE | Noted: 2022-06-08

## 2022-06-08 PROBLEM — D18.01 HEMANGIOMA OF SKIN AND SUBCUTANEOUS TISSUE: Status: ACTIVE | Noted: 2022-06-08

## 2022-06-08 PROCEDURE — ? LIQUID NITROGEN

## 2022-06-08 PROCEDURE — 17000 DESTRUCT PREMALG LESION: CPT

## 2022-06-08 PROCEDURE — ? COUNSELING

## 2022-06-08 PROCEDURE — 99213 OFFICE O/P EST LOW 20 MIN: CPT | Mod: 25

## 2022-06-08 PROCEDURE — ? SUNSCREEN RECOMMENDATIONS

## 2022-06-08 ASSESSMENT — LOCATION DETAILED DESCRIPTION DERM
LOCATION DETAILED: LEFT THENAR EMINENCE
LOCATION DETAILED: LEFT SUPERIOR LATERAL BUCCAL CHEEK
LOCATION DETAILED: LEFT MID-UPPER BACK
LOCATION DETAILED: RIGHT MID-UPPER BACK
LOCATION DETAILED: LEFT RIB CAGE

## 2022-06-08 ASSESSMENT — LOCATION ZONE DERM
LOCATION ZONE: FACE
LOCATION ZONE: HAND
LOCATION ZONE: TRUNK

## 2022-06-08 ASSESSMENT — LOCATION SIMPLE DESCRIPTION DERM
LOCATION SIMPLE: ABDOMEN
LOCATION SIMPLE: RIGHT UPPER BACK
LOCATION SIMPLE: LEFT UPPER BACK
LOCATION SIMPLE: LEFT HAND
LOCATION SIMPLE: LEFT CHEEK

## 2022-06-08 NOTE — PROCEDURE: COUNSELING
Detail Level: Generalized
Detail Level: Zone
Patient Specific Counseling (Will Not Stick From Patient To Patient): - Noted in HPI

## 2022-06-08 NOTE — PROCEDURE: MIPS QUALITY
Quality 226: Preventive Care And Screening: Tobacco Use: Screening And Cessation Intervention: Patient screened for tobacco use and is an ex/non-smoker
Quality 111:Pneumonia Vaccination Status For Older Adults: Pneumococcal vaccine administered on or after patient’s 60th birthday and before the end of the measurement period
Detail Level: Detailed
Quality 110: Preventive Care And Screening: Influenza Immunization: Influenza Immunization previously received during influenza season

## 2022-09-29 ENCOUNTER — APPOINTMENT (RX ONLY)
Dept: URBAN - NONMETROPOLITAN AREA CLINIC 13 | Facility: CLINIC | Age: 82
Setting detail: DERMATOLOGY
End: 2022-09-29

## 2022-09-29 DIAGNOSIS — L82.1 OTHER SEBORRHEIC KERATOSIS: ICD-10-CM

## 2022-09-29 PROCEDURE — ? OBSERVATION AND MEASURE

## 2022-09-29 PROCEDURE — ? COUNSELING

## 2022-09-29 PROCEDURE — 99212 OFFICE O/P EST SF 10 MIN: CPT

## 2022-09-29 ASSESSMENT — LOCATION SIMPLE DESCRIPTION DERM
LOCATION SIMPLE: RIGHT UPPER BACK
LOCATION SIMPLE: LEFT UPPER BACK

## 2022-09-29 ASSESSMENT — LOCATION DETAILED DESCRIPTION DERM
LOCATION DETAILED: RIGHT INFERIOR UPPER BACK
LOCATION DETAILED: LEFT INFERIOR UPPER BACK

## 2022-09-29 ASSESSMENT — LOCATION ZONE DERM: LOCATION ZONE: TRUNK

## 2022-12-06 ENCOUNTER — APPOINTMENT (RX ONLY)
Dept: URBAN - NONMETROPOLITAN AREA CLINIC 13 | Facility: CLINIC | Age: 82
Setting detail: DERMATOLOGY
End: 2022-12-06

## 2022-12-06 DIAGNOSIS — L82.1 OTHER SEBORRHEIC KERATOSIS: ICD-10-CM

## 2022-12-06 DIAGNOSIS — D22 MELANOCYTIC NEVI: ICD-10-CM

## 2022-12-06 DIAGNOSIS — D18.0 HEMANGIOMA: ICD-10-CM

## 2022-12-06 DIAGNOSIS — L57.8 OTHER SKIN CHANGES DUE TO CHRONIC EXPOSURE TO NONIONIZING RADIATION: ICD-10-CM

## 2022-12-06 DIAGNOSIS — L57.0 ACTINIC KERATOSIS: ICD-10-CM

## 2022-12-06 PROBLEM — D18.01 HEMANGIOMA OF SKIN AND SUBCUTANEOUS TISSUE: Status: ACTIVE | Noted: 2022-12-06

## 2022-12-06 PROBLEM — D22.5 MELANOCYTIC NEVI OF TRUNK: Status: ACTIVE | Noted: 2022-12-06

## 2022-12-06 PROCEDURE — 99213 OFFICE O/P EST LOW 20 MIN: CPT | Mod: 25

## 2022-12-06 PROCEDURE — ? LIQUID NITROGEN

## 2022-12-06 PROCEDURE — 17000 DESTRUCT PREMALG LESION: CPT

## 2022-12-06 PROCEDURE — ? COUNSELING

## 2022-12-06 PROCEDURE — ? SUNSCREEN RECOMMENDATIONS

## 2022-12-06 ASSESSMENT — LOCATION ZONE DERM
LOCATION ZONE: ARM
LOCATION ZONE: FACE
LOCATION ZONE: TRUNK

## 2022-12-06 ASSESSMENT — LOCATION DETAILED DESCRIPTION DERM
LOCATION DETAILED: LEFT RIB CAGE
LOCATION DETAILED: RIGHT LATERAL ELBOW
LOCATION DETAILED: LEFT MID-UPPER BACK
LOCATION DETAILED: RIGHT MID-UPPER BACK
LOCATION DETAILED: LEFT SUPERIOR LATERAL BUCCAL CHEEK

## 2022-12-06 ASSESSMENT — LOCATION SIMPLE DESCRIPTION DERM
LOCATION SIMPLE: ABDOMEN
LOCATION SIMPLE: RIGHT UPPER BACK
LOCATION SIMPLE: LEFT UPPER BACK
LOCATION SIMPLE: LEFT CHEEK
LOCATION SIMPLE: RIGHT ELBOW

## 2023-06-06 ENCOUNTER — APPOINTMENT (RX ONLY)
Dept: URBAN - NONMETROPOLITAN AREA CLINIC 13 | Facility: CLINIC | Age: 83
Setting detail: DERMATOLOGY
End: 2023-06-06

## 2023-06-06 DIAGNOSIS — D22 MELANOCYTIC NEVI: ICD-10-CM

## 2023-06-06 DIAGNOSIS — L57.8 OTHER SKIN CHANGES DUE TO CHRONIC EXPOSURE TO NONIONIZING RADIATION: ICD-10-CM

## 2023-06-06 DIAGNOSIS — L82.1 OTHER SEBORRHEIC KERATOSIS: ICD-10-CM

## 2023-06-06 DIAGNOSIS — D18.0 HEMANGIOMA: ICD-10-CM

## 2023-06-06 PROBLEM — D22.5 MELANOCYTIC NEVI OF TRUNK: Status: ACTIVE | Noted: 2023-06-06

## 2023-06-06 PROBLEM — D18.01 HEMANGIOMA OF SKIN AND SUBCUTANEOUS TISSUE: Status: ACTIVE | Noted: 2023-06-06

## 2023-06-06 PROCEDURE — 99213 OFFICE O/P EST LOW 20 MIN: CPT

## 2023-06-06 PROCEDURE — ? SUNSCREEN RECOMMENDATIONS

## 2023-06-06 PROCEDURE — ? COUNSELING

## 2023-06-06 ASSESSMENT — LOCATION DETAILED DESCRIPTION DERM
LOCATION DETAILED: LEFT RIB CAGE
LOCATION DETAILED: LEFT MID-UPPER BACK
LOCATION DETAILED: LEFT SUPERIOR LATERAL BUCCAL CHEEK
LOCATION DETAILED: RIGHT MID-UPPER BACK

## 2023-06-06 ASSESSMENT — LOCATION ZONE DERM
LOCATION ZONE: TRUNK
LOCATION ZONE: FACE

## 2023-06-06 ASSESSMENT — LOCATION SIMPLE DESCRIPTION DERM
LOCATION SIMPLE: LEFT UPPER BACK
LOCATION SIMPLE: LEFT CHEEK
LOCATION SIMPLE: ABDOMEN
LOCATION SIMPLE: RIGHT UPPER BACK

## 2023-12-19 ENCOUNTER — APPOINTMENT (RX ONLY)
Dept: URBAN - NONMETROPOLITAN AREA CLINIC 13 | Facility: CLINIC | Age: 83
Setting detail: DERMATOLOGY
End: 2023-12-19

## 2023-12-19 DIAGNOSIS — L82.1 OTHER SEBORRHEIC KERATOSIS: ICD-10-CM

## 2023-12-19 DIAGNOSIS — L57.8 OTHER SKIN CHANGES DUE TO CHRONIC EXPOSURE TO NONIONIZING RADIATION: ICD-10-CM

## 2023-12-19 DIAGNOSIS — L57.0 ACTINIC KERATOSIS: ICD-10-CM

## 2023-12-19 DIAGNOSIS — D18.0 HEMANGIOMA: ICD-10-CM

## 2023-12-19 DIAGNOSIS — D22 MELANOCYTIC NEVI: ICD-10-CM

## 2023-12-19 PROBLEM — D18.01 HEMANGIOMA OF SKIN AND SUBCUTANEOUS TISSUE: Status: ACTIVE | Noted: 2023-12-19

## 2023-12-19 PROBLEM — D22.5 MELANOCYTIC NEVI OF TRUNK: Status: ACTIVE | Noted: 2023-12-19

## 2023-12-19 PROCEDURE — ? LIQUID NITROGEN

## 2023-12-19 PROCEDURE — ? COUNSELING

## 2023-12-19 PROCEDURE — 17000 DESTRUCT PREMALG LESION: CPT

## 2023-12-19 PROCEDURE — ? SUNSCREEN RECOMMENDATIONS

## 2023-12-19 PROCEDURE — 99213 OFFICE O/P EST LOW 20 MIN: CPT | Mod: 25

## 2023-12-19 ASSESSMENT — LOCATION DETAILED DESCRIPTION DERM
LOCATION DETAILED: RIGHT MID-UPPER BACK
LOCATION DETAILED: LEFT MID-UPPER BACK
LOCATION DETAILED: LEFT SUPERIOR LATERAL BUCCAL CHEEK
LOCATION DETAILED: LEFT LATERAL ZYGOMA
LOCATION DETAILED: LEFT RIB CAGE

## 2023-12-19 ASSESSMENT — LOCATION SIMPLE DESCRIPTION DERM
LOCATION SIMPLE: LEFT ZYGOMA
LOCATION SIMPLE: RIGHT UPPER BACK
LOCATION SIMPLE: LEFT UPPER BACK
LOCATION SIMPLE: ABDOMEN
LOCATION SIMPLE: LEFT CHEEK

## 2023-12-19 ASSESSMENT — LOCATION ZONE DERM
LOCATION ZONE: FACE
LOCATION ZONE: TRUNK

## 2024-03-05 ENCOUNTER — APPOINTMENT (RX ONLY)
Dept: URBAN - NONMETROPOLITAN AREA CLINIC 13 | Facility: CLINIC | Age: 84
Setting detail: DERMATOLOGY
End: 2024-03-05

## 2024-03-05 DIAGNOSIS — L82.1 OTHER SEBORRHEIC KERATOSIS: ICD-10-CM

## 2024-03-05 PROCEDURE — ? COUNSELING

## 2024-03-05 PROCEDURE — ? OBSERVATION AND MEASURE

## 2024-03-05 PROCEDURE — 99212 OFFICE O/P EST SF 10 MIN: CPT

## 2024-03-05 ASSESSMENT — LOCATION DETAILED DESCRIPTION DERM: LOCATION DETAILED: RIGHT SUPERIOR LATERAL MIDBACK

## 2024-03-05 ASSESSMENT — LOCATION SIMPLE DESCRIPTION DERM: LOCATION SIMPLE: RIGHT LOWER BACK

## 2024-03-05 ASSESSMENT — LOCATION ZONE DERM: LOCATION ZONE: TRUNK

## 2024-05-30 ENCOUNTER — APPOINTMENT (RX ONLY)
Dept: URBAN - NONMETROPOLITAN AREA CLINIC 13 | Facility: CLINIC | Age: 84
Setting detail: DERMATOLOGY
End: 2024-05-30

## 2024-05-30 DIAGNOSIS — L57.0 ACTINIC KERATOSIS: ICD-10-CM

## 2024-05-30 DIAGNOSIS — D22 MELANOCYTIC NEVI: ICD-10-CM

## 2024-05-30 DIAGNOSIS — D18.0 HEMANGIOMA: ICD-10-CM

## 2024-05-30 DIAGNOSIS — L82.1 OTHER SEBORRHEIC KERATOSIS: ICD-10-CM

## 2024-05-30 DIAGNOSIS — L57.8 OTHER SKIN CHANGES DUE TO CHRONIC EXPOSURE TO NONIONIZING RADIATION: ICD-10-CM

## 2024-05-30 PROBLEM — D18.01 HEMANGIOMA OF SKIN AND SUBCUTANEOUS TISSUE: Status: ACTIVE | Noted: 2024-05-30

## 2024-05-30 PROBLEM — D22.5 MELANOCYTIC NEVI OF TRUNK: Status: ACTIVE | Noted: 2024-05-30

## 2024-05-30 PROCEDURE — 17000 DESTRUCT PREMALG LESION: CPT

## 2024-05-30 PROCEDURE — ? COUNSELING

## 2024-05-30 PROCEDURE — ? LIQUID NITROGEN

## 2024-05-30 PROCEDURE — 17003 DESTRUCT PREMALG LES 2-14: CPT

## 2024-05-30 PROCEDURE — ? SUNSCREEN RECOMMENDATIONS

## 2024-05-30 PROCEDURE — 99213 OFFICE O/P EST LOW 20 MIN: CPT | Mod: 25

## 2024-05-30 ASSESSMENT — LOCATION DETAILED DESCRIPTION DERM
LOCATION DETAILED: LEFT RADIAL DORSAL HAND
LOCATION DETAILED: RIGHT MEDIAL MALAR CHEEK
LOCATION DETAILED: LEFT RIB CAGE
LOCATION DETAILED: LEFT SUPERIOR LATERAL BUCCAL CHEEK
LOCATION DETAILED: LEFT MID-UPPER BACK
LOCATION DETAILED: RIGHT MID-UPPER BACK

## 2024-05-30 ASSESSMENT — LOCATION SIMPLE DESCRIPTION DERM
LOCATION SIMPLE: RIGHT UPPER BACK
LOCATION SIMPLE: LEFT HAND
LOCATION SIMPLE: RIGHT CHEEK
LOCATION SIMPLE: LEFT CHEEK
LOCATION SIMPLE: ABDOMEN
LOCATION SIMPLE: LEFT UPPER BACK

## 2024-05-30 ASSESSMENT — LOCATION ZONE DERM
LOCATION ZONE: TRUNK
LOCATION ZONE: FACE
LOCATION ZONE: HAND

## 2024-05-30 NOTE — PROCEDURE: LIQUID NITROGEN
Duration Of Freeze Thaw-Cycle (Seconds): 0
Total Number Of Aks Treated: 4
Render Post-Care Instructions In Note?: no
Post-Care Instructions: I reviewed with the patient in detail post-care instructions. Patient is to wear sunprotection, and avoid picking at any of the treated lesions. Pt may apply Vaseline to crusted or scabbing areas.
Consent: The patient's consent was obtained including but not limited to risks of crusting, scabbing, blistering, scarring, darker or lighter pigmentary change, recurrence, incomplete removal and infection.
Detail Level: Zone

## 2025-01-14 ENCOUNTER — APPOINTMENT (OUTPATIENT)
Dept: URBAN - NONMETROPOLITAN AREA CLINIC 13 | Facility: CLINIC | Age: 85
Setting detail: DERMATOLOGY
End: 2025-01-14

## 2025-01-14 DIAGNOSIS — L57.0 ACTINIC KERATOSIS: ICD-10-CM

## 2025-01-14 DIAGNOSIS — L82.1 OTHER SEBORRHEIC KERATOSIS: ICD-10-CM

## 2025-01-14 DIAGNOSIS — D18.0 HEMANGIOMA: ICD-10-CM

## 2025-01-14 DIAGNOSIS — D22 MELANOCYTIC NEVI: ICD-10-CM

## 2025-01-14 DIAGNOSIS — L57.8 OTHER SKIN CHANGES DUE TO CHRONIC EXPOSURE TO NONIONIZING RADIATION: ICD-10-CM

## 2025-01-14 PROBLEM — D22.5 MELANOCYTIC NEVI OF TRUNK: Status: ACTIVE | Noted: 2025-01-14

## 2025-01-14 PROBLEM — D18.01 HEMANGIOMA OF SKIN AND SUBCUTANEOUS TISSUE: Status: ACTIVE | Noted: 2025-01-14

## 2025-01-14 PROCEDURE — 17000 DESTRUCT PREMALG LESION: CPT

## 2025-01-14 PROCEDURE — 99213 OFFICE O/P EST LOW 20 MIN: CPT | Mod: 25

## 2025-01-14 PROCEDURE — ? COUNSELING

## 2025-01-14 PROCEDURE — ? SUNSCREEN RECOMMENDATIONS

## 2025-01-14 PROCEDURE — 17003 DESTRUCT PREMALG LES 2-14: CPT

## 2025-01-14 PROCEDURE — ? LIQUID NITROGEN

## 2025-01-14 ASSESSMENT — LOCATION DETAILED DESCRIPTION DERM
LOCATION DETAILED: LEFT MEDIAL FOREHEAD
LOCATION DETAILED: LEFT MID-UPPER BACK
LOCATION DETAILED: LEFT SUPERIOR LATERAL BUCCAL CHEEK
LOCATION DETAILED: LEFT RIB CAGE
LOCATION DETAILED: RIGHT MID-UPPER BACK

## 2025-01-14 ASSESSMENT — LOCATION SIMPLE DESCRIPTION DERM
LOCATION SIMPLE: LEFT UPPER BACK
LOCATION SIMPLE: LEFT CHEEK
LOCATION SIMPLE: ABDOMEN
LOCATION SIMPLE: LEFT FOREHEAD
LOCATION SIMPLE: RIGHT UPPER BACK

## 2025-01-14 ASSESSMENT — LOCATION ZONE DERM
LOCATION ZONE: FACE
LOCATION ZONE: TRUNK

## 2025-01-14 NOTE — PROCEDURE: LIQUID NITROGEN
Consent: The patient's consent was obtained including but not limited to risks of crusting, scabbing, blistering, scarring, darker or lighter pigmentary change, recurrence, incomplete removal and infection.
Detail Level: Zone
Render Post-Care Instructions In Note?: no
Post-Care Instructions: I reviewed with the patient in detail post-care instructions. Patient is to wear sunprotection, and avoid picking at any of the treated lesions. Pt may apply Vaseline to crusted or scabbing areas.
Total Number Of Aks Treated: 6
Duration Of Freeze Thaw-Cycle (Seconds): 0

## 2025-07-16 ENCOUNTER — APPOINTMENT (OUTPATIENT)
Dept: URBAN - NONMETROPOLITAN AREA CLINIC 13 | Facility: CLINIC | Age: 85
Setting detail: DERMATOLOGY
End: 2025-07-16

## 2025-07-16 DIAGNOSIS — L57.8 OTHER SKIN CHANGES DUE TO CHRONIC EXPOSURE TO NONIONIZING RADIATION: ICD-10-CM

## 2025-07-16 DIAGNOSIS — L82.1 OTHER SEBORRHEIC KERATOSIS: ICD-10-CM

## 2025-07-16 DIAGNOSIS — L57.0 ACTINIC KERATOSIS: ICD-10-CM

## 2025-07-16 DIAGNOSIS — Z85.828 PERSONAL HISTORY OF OTHER MALIGNANT NEOPLASM OF SKIN: ICD-10-CM

## 2025-07-16 DIAGNOSIS — D22 MELANOCYTIC NEVI: ICD-10-CM

## 2025-07-16 DIAGNOSIS — D18.0 HEMANGIOMA: ICD-10-CM

## 2025-07-16 PROBLEM — D18.01 HEMANGIOMA OF SKIN AND SUBCUTANEOUS TISSUE: Status: ACTIVE | Noted: 2025-07-16

## 2025-07-16 PROBLEM — D22.5 MELANOCYTIC NEVI OF TRUNK: Status: ACTIVE | Noted: 2025-07-16

## 2025-07-16 PROCEDURE — ? COUNSELING

## 2025-07-16 PROCEDURE — ? LIQUID NITROGEN

## 2025-07-16 PROCEDURE — ? SUNSCREEN RECOMMENDATIONS

## 2025-07-16 ASSESSMENT — LOCATION ZONE DERM
LOCATION ZONE: TRUNK
LOCATION ZONE: FACE

## 2025-07-16 ASSESSMENT — LOCATION SIMPLE DESCRIPTION DERM
LOCATION SIMPLE: RIGHT UPPER BACK
LOCATION SIMPLE: LEFT UPPER BACK
LOCATION SIMPLE: ABDOMEN
LOCATION SIMPLE: RIGHT CHEEK
LOCATION SIMPLE: LEFT CHEEK

## 2025-07-16 ASSESSMENT — LOCATION DETAILED DESCRIPTION DERM
LOCATION DETAILED: LEFT SUPERIOR LATERAL BUCCAL CHEEK
LOCATION DETAILED: RIGHT MID-UPPER BACK
LOCATION DETAILED: LEFT MID-UPPER BACK
LOCATION DETAILED: RIGHT INFERIOR CENTRAL MALAR CHEEK
LOCATION DETAILED: LEFT RIB CAGE

## 2025-07-16 NOTE — PROCEDURE: LIQUID NITROGEN
Render In Bullet Format When Appropriate: No
Total Number Of Aks Treated: 4
Detail Level: Zone
Post-Care Instructions: I reviewed with the patient in detail post-care instructions. Patient is to wear sunprotection, and avoid picking at any of the treated lesions. Pt may apply Vaseline to crusted or scabbing areas.
Duration Of Freeze Thaw-Cycle (Seconds): 0
Consent: The patient's consent was obtained including but not limited to risks of crusting, scabbing, blistering, scarring, darker or lighter pigmentary change, recurrence, incomplete removal and infection.